# Patient Record
Sex: FEMALE | Race: WHITE | NOT HISPANIC OR LATINO
[De-identification: names, ages, dates, MRNs, and addresses within clinical notes are randomized per-mention and may not be internally consistent; named-entity substitution may affect disease eponyms.]

---

## 2017-06-14 ENCOUNTER — APPOINTMENT (OUTPATIENT)
Dept: OTOLARYNGOLOGY | Facility: CLINIC | Age: 57
End: 2017-06-14

## 2017-06-14 VITALS
WEIGHT: 159 LBS | HEART RATE: 76 BPM | BODY MASS INDEX: 24.66 KG/M2 | SYSTOLIC BLOOD PRESSURE: 129 MMHG | HEIGHT: 67.5 IN | DIASTOLIC BLOOD PRESSURE: 73 MMHG

## 2017-07-19 ENCOUNTER — APPOINTMENT (OUTPATIENT)
Dept: OTOLARYNGOLOGY | Facility: CLINIC | Age: 57
End: 2017-07-19

## 2017-07-20 ENCOUNTER — RESULT REVIEW (OUTPATIENT)
Age: 57
End: 2017-07-20

## 2017-11-13 ENCOUNTER — RX RENEWAL (OUTPATIENT)
Age: 57
End: 2017-11-13

## 2017-12-06 ENCOUNTER — APPOINTMENT (OUTPATIENT)
Dept: SPINE | Facility: CLINIC | Age: 57
End: 2017-12-06
Payer: COMMERCIAL

## 2017-12-06 VITALS
HEART RATE: 80 BPM | SYSTOLIC BLOOD PRESSURE: 120 MMHG | HEIGHT: 67.5 IN | DIASTOLIC BLOOD PRESSURE: 74 MMHG | BODY MASS INDEX: 24.51 KG/M2 | WEIGHT: 158 LBS

## 2017-12-06 PROCEDURE — 99214 OFFICE O/P EST MOD 30 MIN: CPT

## 2017-12-06 RX ORDER — ACETAMINOPHEN 325 MG/1
TABLET, FILM COATED ORAL
Refills: 0 | Status: COMPLETED | COMMUNITY
End: 2017-12-06

## 2017-12-06 RX ORDER — ASPIRIN 325 MG/1
TABLET, FILM COATED ORAL
Refills: 0 | Status: COMPLETED | COMMUNITY
End: 2017-12-06

## 2019-03-22 ENCOUNTER — OUTPATIENT (OUTPATIENT)
Dept: OUTPATIENT SERVICES | Facility: HOSPITAL | Age: 59
LOS: 1 days | End: 2019-03-22
Payer: COMMERCIAL

## 2019-03-22 ENCOUNTER — TRANSCRIPTION ENCOUNTER (OUTPATIENT)
Age: 59
End: 2019-03-22

## 2019-03-22 ENCOUNTER — APPOINTMENT (OUTPATIENT)
Dept: RADIOLOGY | Facility: HOSPITAL | Age: 59
End: 2019-03-22
Payer: COMMERCIAL

## 2019-03-22 DIAGNOSIS — R10.13 EPIGASTRIC PAIN: ICD-10-CM

## 2019-03-22 PROCEDURE — 74241: CPT | Mod: 26

## 2019-03-22 PROCEDURE — 74241: CPT

## 2019-04-08 ENCOUNTER — APPOINTMENT (OUTPATIENT)
Age: 59
End: 2019-04-08

## 2019-04-08 VITALS
SYSTOLIC BLOOD PRESSURE: 146 MMHG | RESPIRATION RATE: 15 BRPM | OXYGEN SATURATION: 98 % | BODY MASS INDEX: 24.35 KG/M2 | WEIGHT: 157 LBS | TEMPERATURE: 98.2 F | DIASTOLIC BLOOD PRESSURE: 89 MMHG | HEART RATE: 79 BPM | HEIGHT: 67.5 IN

## 2019-05-10 ENCOUNTER — RX RENEWAL (OUTPATIENT)
Age: 59
End: 2019-05-10

## 2019-05-10 ENCOUNTER — OUTPATIENT (OUTPATIENT)
Dept: OUTPATIENT SERVICES | Facility: HOSPITAL | Age: 59
LOS: 1 days | End: 2019-05-10
Payer: COMMERCIAL

## 2019-05-10 ENCOUNTER — APPOINTMENT (OUTPATIENT)
Dept: SURGERY | Facility: CLINIC | Age: 59
End: 2019-05-10
Payer: COMMERCIAL

## 2019-05-10 VITALS
SYSTOLIC BLOOD PRESSURE: 115 MMHG | HEART RATE: 71 BPM | OXYGEN SATURATION: 98 % | TEMPERATURE: 99 F | WEIGHT: 158.07 LBS | DIASTOLIC BLOOD PRESSURE: 80 MMHG | HEIGHT: 67 IN | RESPIRATION RATE: 14 BRPM

## 2019-05-10 DIAGNOSIS — Z87.898 PERSONAL HISTORY OF OTHER SPECIFIED CONDITIONS: Chronic | ICD-10-CM

## 2019-05-10 DIAGNOSIS — Z29.9 ENCOUNTER FOR PROPHYLACTIC MEASURES, UNSPECIFIED: ICD-10-CM

## 2019-05-10 DIAGNOSIS — K44.9 DIAPHRAGMATIC HERNIA WITHOUT OBSTRUCTION OR GANGRENE: ICD-10-CM

## 2019-05-10 DIAGNOSIS — Z01.818 ENCOUNTER FOR OTHER PREPROCEDURAL EXAMINATION: ICD-10-CM

## 2019-05-10 LAB
ANION GAP SERPL CALC-SCNC: 12 MMOL/L — SIGNIFICANT CHANGE UP (ref 5–17)
BLD GP AB SCN SERPL QL: NEGATIVE — SIGNIFICANT CHANGE UP
BUN SERPL-MCNC: 16 MG/DL — SIGNIFICANT CHANGE UP (ref 7–23)
CALCIUM SERPL-MCNC: 9.8 MG/DL — SIGNIFICANT CHANGE UP (ref 8.4–10.5)
CHLORIDE SERPL-SCNC: 103 MMOL/L — SIGNIFICANT CHANGE UP (ref 96–108)
CO2 SERPL-SCNC: 24 MMOL/L — SIGNIFICANT CHANGE UP (ref 22–31)
CREAT SERPL-MCNC: 0.72 MG/DL — SIGNIFICANT CHANGE UP (ref 0.5–1.3)
GLUCOSE SERPL-MCNC: 102 MG/DL — HIGH (ref 70–99)
HCT VFR BLD CALC: 44.7 % — SIGNIFICANT CHANGE UP (ref 34.5–45)
HGB BLD-MCNC: 14 G/DL — SIGNIFICANT CHANGE UP (ref 11.5–15.5)
MCHC RBC-ENTMCNC: 26.1 PG — LOW (ref 27–34)
MCHC RBC-ENTMCNC: 31.3 GM/DL — LOW (ref 32–36)
MCV RBC AUTO: 83.4 FL — SIGNIFICANT CHANGE UP (ref 80–100)
PLATELET # BLD AUTO: 274 K/UL — SIGNIFICANT CHANGE UP (ref 150–400)
POTASSIUM SERPL-MCNC: 4.6 MMOL/L — SIGNIFICANT CHANGE UP (ref 3.5–5.3)
POTASSIUM SERPL-SCNC: 4.6 MMOL/L — SIGNIFICANT CHANGE UP (ref 3.5–5.3)
RBC # BLD: 5.36 M/UL — HIGH (ref 3.8–5.2)
RBC # FLD: 14.3 % — SIGNIFICANT CHANGE UP (ref 10.3–14.5)
RH IG SCN BLD-IMP: POSITIVE — SIGNIFICANT CHANGE UP
SODIUM SERPL-SCNC: 139 MMOL/L — SIGNIFICANT CHANGE UP (ref 135–145)
WBC # BLD: 6.83 K/UL — SIGNIFICANT CHANGE UP (ref 3.8–10.5)
WBC # FLD AUTO: 6.83 K/UL — SIGNIFICANT CHANGE UP (ref 3.8–10.5)

## 2019-05-10 PROCEDURE — 99245 OFF/OP CONSLTJ NEW/EST HI 55: CPT

## 2019-05-10 PROCEDURE — 80048 BASIC METABOLIC PNL TOTAL CA: CPT

## 2019-05-10 PROCEDURE — 86900 BLOOD TYPING SEROLOGIC ABO: CPT

## 2019-05-10 PROCEDURE — G0463: CPT

## 2019-05-10 PROCEDURE — 86850 RBC ANTIBODY SCREEN: CPT

## 2019-05-10 PROCEDURE — 86901 BLOOD TYPING SEROLOGIC RH(D): CPT

## 2019-05-10 PROCEDURE — 85027 COMPLETE CBC AUTOMATED: CPT

## 2019-05-10 RX ORDER — SODIUM CHLORIDE 9 MG/ML
3 INJECTION INTRAMUSCULAR; INTRAVENOUS; SUBCUTANEOUS EVERY 8 HOURS
Refills: 0 | Status: DISCONTINUED | OUTPATIENT
Start: 2019-05-15 | End: 2019-05-15

## 2019-05-10 RX ORDER — CEFAZOLIN SODIUM 1 G
2000 VIAL (EA) INJECTION ONCE
Refills: 0 | Status: COMPLETED | OUTPATIENT
Start: 2019-05-15 | End: 2019-05-15

## 2019-05-10 RX ORDER — LIDOCAINE HCL 20 MG/ML
0.2 VIAL (ML) INJECTION ONCE
Refills: 0 | Status: DISCONTINUED | OUTPATIENT
Start: 2019-05-15 | End: 2019-05-15

## 2019-05-10 RX ORDER — CHLORHEXIDINE GLUCONATE 213 G/1000ML
1 SOLUTION TOPICAL DAILY
Refills: 0 | Status: DISCONTINUED | OUTPATIENT
Start: 2019-05-15 | End: 2019-05-15

## 2019-05-10 NOTE — H&P PST ADULT - NSICDXPASTMEDICALHX_GEN_ALL_CORE_FT
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

## 2019-05-10 NOTE — H&P PST ADULT - NSANTHOSAYNRD_GEN_A_CORE
No. DONG screening performed.  STOP BANG Legend: 0-2 = LOW Risk; 3-4 = INTERMEDIATE Risk; 5-8 = HIGH Risk

## 2019-05-10 NOTE — H&P PST ADULT - ASSESSMENT

## 2019-05-10 NOTE — H&P PST ADULT - HISTORY OF PRESENT ILLNESS
The patient is overdue to have a follow up MRI of the brain with and without contrast. A new MRI was ordered. The patient is to return with the images for review. 57 yo female. PMH benign brain tumor ("foramen magnum"), s/p excision 2013.  c/o abd discomfort, nausea.  work up revealed diapharmagmatic hernia. now presents to PST scheduled for surgical repair.

## 2019-05-10 NOTE — HISTORY OF PRESENT ILLNESS
[de-identified] : Milla is a 57 y/o female here for evaluation of paraesophageal hernia. This patient was referred with a very large hiatal hernia diagnosed by upper GI x-ray and endoscopy the patient has been known to have a hernia for some time but recently it has become symptomatic with abdominal discomfort and nausea especially after eating she denies nocturnal regurgitation she gets bloated occasionally. That has occasional shortness of breath during exercise. She is here for evaluation for repair of this large hiatal hernia and

## 2019-05-10 NOTE — PHYSICAL EXAM
[JVD] : no jugular venous distention  [Normal Breath Sounds] : Normal breath sounds [Normal Heart Sounds] : normal heart sounds [Abdominal Masses] : No abdominal masses [Abdomen Tenderness] : ~T ~M No abdominal tenderness [No HSM] : no hepatosplenomegaly [Tender] : was nontender [Enlarged] : not enlarged [No Rash or Lesion] : No rash or lesion [de-identified] : within normal limits [Calm] : calm [de-identified] : soft and nontender nondistended there are no hernias or masses appreciated [de-identified] : full range of motion [de-identified] : within normal limits [de-identified] : cranial nerves II through XII grossly intact

## 2019-05-10 NOTE — H&P PST ADULT - NSICDXPROBLEM_GEN_ALL_CORE_FT
PROBLEM DIAGNOSES  Problem: Diaphragmatic hernia  Assessment and Plan: laparoscopic paraesophageal hernia repair    Problem: Need for prophylactic measure  Assessment and Plan: The Caprini score indicates this patient is at risk for a VTE event (score 3-5).  Most surgical patients in this group would benefit from pharmacologic prophylaxis.  The surgical team will determine the balance between VTE risk and bleeding risk

## 2019-05-10 NOTE — ASSESSMENT
[FreeTextEntry1] : 58-year-old female with large intrathoracic stomach. With increasing symptoms. She is here for evaluation for repair of this large paraesophageal hernia. I have discussed at length the risks benefits and expectations of the procedure all of her questions were answered.\par The risk and benefits of the procedure were discussed with patient. The risk including, but not limited to, risk of bleeding, infection, recurrence, injury to other organs (nerves, esophagus, stomach, liver, etc), bloating, wrap being too tight and requiring reoperation or other interventions, delayed gastric emptying and persistent reflux. Several general anesthesia risks like MI, Stroke, respiratory complications and death were discussed. Need for modified diet postoperatively was discussed. All questions were answered and education materials were provided.\par \par \par

## 2019-05-10 NOTE — CONSULT LETTER
[Dear  ___] : Dear  [unfilled], [Consult Letter:] : I had the pleasure of evaluating your patient, [unfilled]. [Please see my note below.] : Please see my note below. [Consult Closing:] : Thank you very much for allowing me to participate in the care of this patient.  If you have any questions, please do not hesitate to contact me. [FreeTextEntry3] : Gregory Faye MD, FACS, FASMBS\par Chief Division of Minimally Invasive Surgery\par Co-Director of Bariatric Surgery \par [facility]\[place] [address] [Sincerely,] : Sincerely,

## 2019-05-14 ENCOUNTER — TRANSCRIPTION ENCOUNTER (OUTPATIENT)
Age: 59
End: 2019-05-14

## 2019-05-15 ENCOUNTER — APPOINTMENT (OUTPATIENT)
Dept: SURGERY | Facility: HOSPITAL | Age: 59
End: 2019-05-15
Payer: COMMERCIAL

## 2019-05-15 ENCOUNTER — OUTPATIENT (OUTPATIENT)
Dept: INPATIENT UNIT | Facility: HOSPITAL | Age: 59
LOS: 1 days | End: 2019-05-15
Payer: COMMERCIAL

## 2019-05-15 ENCOUNTER — RESULT REVIEW (OUTPATIENT)
Age: 59
End: 2019-05-15

## 2019-05-15 VITALS
TEMPERATURE: 98 F | DIASTOLIC BLOOD PRESSURE: 83 MMHG | RESPIRATION RATE: 18 BRPM | HEART RATE: 77 BPM | OXYGEN SATURATION: 97 % | HEIGHT: 67.5 IN | SYSTOLIC BLOOD PRESSURE: 135 MMHG | WEIGHT: 156.97 LBS

## 2019-05-15 DIAGNOSIS — Z87.898 PERSONAL HISTORY OF OTHER SPECIFIED CONDITIONS: Chronic | ICD-10-CM

## 2019-05-15 DIAGNOSIS — K44.9 DIAPHRAGMATIC HERNIA WITHOUT OBSTRUCTION OR GANGRENE: ICD-10-CM

## 2019-05-15 PROCEDURE — 88302 TISSUE EXAM BY PATHOLOGIST: CPT | Mod: 26

## 2019-05-15 PROCEDURE — 43281 LAP PARAESOPHAG HERN REPAIR: CPT

## 2019-05-15 RX ORDER — ACETAMINOPHEN 500 MG
1000 TABLET ORAL ONCE
Refills: 0 | Status: COMPLETED | OUTPATIENT
Start: 2019-05-15 | End: 2019-05-15

## 2019-05-15 RX ORDER — DEXTROSE MONOHYDRATE, SODIUM CHLORIDE, AND POTASSIUM CHLORIDE 50; .745; 4.5 G/1000ML; G/1000ML; G/1000ML
1000 INJECTION, SOLUTION INTRAVENOUS
Refills: 0 | Status: DISCONTINUED | OUTPATIENT
Start: 2019-05-15 | End: 2019-05-16

## 2019-05-15 RX ORDER — ACETAMINOPHEN 500 MG
1000 TABLET ORAL ONCE
Refills: 0 | Status: COMPLETED | OUTPATIENT
Start: 2019-05-16 | End: 2019-05-16

## 2019-05-15 RX ORDER — OXYCODONE HYDROCHLORIDE 5 MG/1
5 TABLET ORAL EVERY 6 HOURS
Refills: 0 | Status: DISCONTINUED | OUTPATIENT
Start: 2019-05-15 | End: 2019-05-16

## 2019-05-15 RX ORDER — ASPIRIN/CALCIUM CARB/MAGNESIUM 324 MG
1 TABLET ORAL
Qty: 0 | Refills: 0 | DISCHARGE

## 2019-05-15 RX ORDER — ENOXAPARIN SODIUM 100 MG/ML
40 INJECTION SUBCUTANEOUS EVERY 24 HOURS
Refills: 0 | Status: DISCONTINUED | OUTPATIENT
Start: 2019-05-16 | End: 2019-05-30

## 2019-05-15 RX ORDER — KETOROLAC TROMETHAMINE 30 MG/ML
15 SYRINGE (ML) INJECTION EVERY 6 HOURS
Refills: 0 | Status: DISCONTINUED | OUTPATIENT
Start: 2019-05-15 | End: 2019-05-16

## 2019-05-15 RX ORDER — HYDROMORPHONE HYDROCHLORIDE 2 MG/ML
0.25 INJECTION INTRAMUSCULAR; INTRAVENOUS; SUBCUTANEOUS
Refills: 0 | Status: DISCONTINUED | OUTPATIENT
Start: 2019-05-15 | End: 2019-05-15

## 2019-05-15 RX ORDER — ONDANSETRON 8 MG/1
4 TABLET, FILM COATED ORAL EVERY 6 HOURS
Refills: 0 | Status: DISCONTINUED | OUTPATIENT
Start: 2019-05-15 | End: 2019-05-30

## 2019-05-15 RX ORDER — DIPHENHYDRAMINE HCL 50 MG
12.5 CAPSULE ORAL ONCE
Refills: 0 | Status: COMPLETED | OUTPATIENT
Start: 2019-05-15 | End: 2019-05-15

## 2019-05-15 RX ORDER — FAMOTIDINE 10 MG/ML
20 INJECTION INTRAVENOUS ONCE
Refills: 0 | Status: COMPLETED | OUTPATIENT
Start: 2019-05-15 | End: 2019-05-15

## 2019-05-15 RX ADMIN — DEXTROSE MONOHYDRATE, SODIUM CHLORIDE, AND POTASSIUM CHLORIDE 100 MILLILITER(S): 50; .745; 4.5 INJECTION, SOLUTION INTRAVENOUS at 19:38

## 2019-05-15 RX ADMIN — FAMOTIDINE 20 MILLIGRAM(S): 10 INJECTION INTRAVENOUS at 14:46

## 2019-05-15 RX ADMIN — Medication 0.5 MILLIGRAM(S): at 15:00

## 2019-05-15 RX ADMIN — OXYCODONE HYDROCHLORIDE 5 MILLIGRAM(S): 5 TABLET ORAL at 18:00

## 2019-05-15 RX ADMIN — Medication 1000 MILLIGRAM(S): at 21:20

## 2019-05-15 RX ADMIN — ONDANSETRON 4 MILLIGRAM(S): 8 TABLET, FILM COATED ORAL at 19:01

## 2019-05-15 RX ADMIN — Medication 400 MILLIGRAM(S): at 20:49

## 2019-05-15 RX ADMIN — Medication 15 MILLIGRAM(S): at 20:17

## 2019-05-15 RX ADMIN — Medication 15 MILLIGRAM(S): at 19:36

## 2019-05-15 RX ADMIN — HYDROMORPHONE HYDROCHLORIDE 0.25 MILLIGRAM(S): 2 INJECTION INTRAMUSCULAR; INTRAVENOUS; SUBCUTANEOUS at 16:41

## 2019-05-15 RX ADMIN — OXYCODONE HYDROCHLORIDE 5 MILLIGRAM(S): 5 TABLET ORAL at 18:30

## 2019-05-15 RX ADMIN — HYDROMORPHONE HYDROCHLORIDE 0.25 MILLIGRAM(S): 2 INJECTION INTRAMUSCULAR; INTRAVENOUS; SUBCUTANEOUS at 17:00

## 2019-05-15 RX ADMIN — Medication 12.5 MILLIGRAM(S): at 14:46

## 2019-05-15 RX ADMIN — HYDROMORPHONE HYDROCHLORIDE 0.25 MILLIGRAM(S): 2 INJECTION INTRAMUSCULAR; INTRAVENOUS; SUBCUTANEOUS at 16:15

## 2019-05-15 RX ADMIN — HYDROMORPHONE HYDROCHLORIDE 0.25 MILLIGRAM(S): 2 INJECTION INTRAMUSCULAR; INTRAVENOUS; SUBCUTANEOUS at 16:00

## 2019-05-15 NOTE — BRIEF OPERATIVE NOTE - OPERATION/FINDINGS
large paraesophageal hernia  sac reduced and excised  posterior cruroplasty, nissen, posterior gastropexy

## 2019-05-15 NOTE — BRIEF OPERATIVE NOTE - NSICDXBRIEFPROCEDURE_GEN_ALL_CORE_FT
PROCEDURES:  Fundoplication, Nissen, laparoscopic 15-May-2019 14:24:07  Paulino Draper  Laparoscopic paraesophageal herniorrhaphy 15-May-2019 14:23:48  Paulino Draper

## 2019-05-15 NOTE — CHART NOTE - NSCHARTNOTEFT_GEN_A_CORE
pt was seen and examined. pain well controlled with medication. no nausea or vomiting.       Vital Signs Last 24 Hrs  T(C): 36.9 (15 May 2019 21:15), Max: 36.9 (15 May 2019 08:42)  T(F): 98.4 (15 May 2019 21:15), Max: 98.4 (15 May 2019 08:42)  HR: 80 (15 May 2019 21:15) (62 - 80)  BP: 110/64 (15 May 2019 21:15) (108/58 - 144/71)  BP(mean): 98 (15 May 2019 19:00) (76 - 104)  RR: 18 (15 May 2019 21:15) (12 - 18)  SpO2: 97% (15 May 2019 21:15) (96% - 100%)    I&O's Detail    15 May 2019 07:01  -  15 May 2019 21:23  --------------------------------------------------------  IN:    dextrose 5% + sodium chloride 0.45% with potassium chloride 20 mEq/L: 400 mL  Total IN: 400 mL    OUT:    Voided: 400 mL  Total OUT: 400 mL    Total NET: 0 mL          MEDICATIONS  (STANDING):  dextrose 5% + sodium chloride 0.45% with potassium chloride 20 mEq/L 1000 milliLiter(s) (100 mL/Hr) IV Continuous <Continuous>  ketorolac   Injectable 15 milliGRAM(s) IV Push every 6 hours  ondansetron   Disintegrating Tablet 4 milliGRAM(s) Oral every 6 hours    MEDICATIONS  (PRN):  oxyCODONE    IR 5 milliGRAM(s) Oral every 6 hours PRN Severe Pain (7 - 10)        Physical exam  no acute distress  resp: non labored  abd. soft, non distended       59 yo female s/p Fundoplication, Nissen, laparoscopy, Laparoscopic paraesophageal herniorrhaphy     NPO/IVF  pain control   FU labs

## 2019-05-16 ENCOUNTER — TRANSCRIPTION ENCOUNTER (OUTPATIENT)
Age: 59
End: 2019-05-16

## 2019-05-16 VITALS
OXYGEN SATURATION: 98 % | HEART RATE: 70 BPM | DIASTOLIC BLOOD PRESSURE: 83 MMHG | RESPIRATION RATE: 18 BRPM | TEMPERATURE: 98 F | SYSTOLIC BLOOD PRESSURE: 144 MMHG

## 2019-05-16 LAB
ANION GAP SERPL CALC-SCNC: 13 MMOL/L — SIGNIFICANT CHANGE UP (ref 5–17)
BASOPHILS # BLD AUTO: 0.01 K/UL — SIGNIFICANT CHANGE UP (ref 0–0.2)
BASOPHILS NFR BLD AUTO: 0.1 % — SIGNIFICANT CHANGE UP (ref 0–2)
BUN SERPL-MCNC: 9 MG/DL — SIGNIFICANT CHANGE UP (ref 7–23)
CALCIUM SERPL-MCNC: 9.1 MG/DL — SIGNIFICANT CHANGE UP (ref 8.4–10.5)
CHLORIDE SERPL-SCNC: 102 MMOL/L — SIGNIFICANT CHANGE UP (ref 96–108)
CO2 SERPL-SCNC: 24 MMOL/L — SIGNIFICANT CHANGE UP (ref 22–31)
CREAT SERPL-MCNC: 0.66 MG/DL — SIGNIFICANT CHANGE UP (ref 0.5–1.3)
EOSINOPHIL # BLD AUTO: 0.01 K/UL — SIGNIFICANT CHANGE UP (ref 0–0.5)
EOSINOPHIL NFR BLD AUTO: 0.1 % — SIGNIFICANT CHANGE UP (ref 0–6)
GLUCOSE SERPL-MCNC: 121 MG/DL — HIGH (ref 70–99)
HCT VFR BLD CALC: 43.2 % — SIGNIFICANT CHANGE UP (ref 34.5–45)
HGB BLD-MCNC: 13.6 G/DL — SIGNIFICANT CHANGE UP (ref 11.5–15.5)
IMM GRANULOCYTES NFR BLD AUTO: 0.3 % — SIGNIFICANT CHANGE UP (ref 0–1.5)
LYMPHOCYTES # BLD AUTO: 1.87 K/UL — SIGNIFICANT CHANGE UP (ref 1–3.3)
LYMPHOCYTES # BLD AUTO: 15.9 % — SIGNIFICANT CHANGE UP (ref 13–44)
MCHC RBC-ENTMCNC: 26.1 PG — LOW (ref 27–34)
MCHC RBC-ENTMCNC: 31.5 GM/DL — LOW (ref 32–36)
MCV RBC AUTO: 82.8 FL — SIGNIFICANT CHANGE UP (ref 80–100)
MONOCYTES # BLD AUTO: 0.73 K/UL — SIGNIFICANT CHANGE UP (ref 0–0.9)
MONOCYTES NFR BLD AUTO: 6.2 % — SIGNIFICANT CHANGE UP (ref 2–14)
NEUTROPHILS # BLD AUTO: 9.07 K/UL — HIGH (ref 1.8–7.4)
NEUTROPHILS NFR BLD AUTO: 77.4 % — HIGH (ref 43–77)
PLATELET # BLD AUTO: 278 K/UL — SIGNIFICANT CHANGE UP (ref 150–400)
POTASSIUM SERPL-MCNC: 4.5 MMOL/L — SIGNIFICANT CHANGE UP (ref 3.5–5.3)
POTASSIUM SERPL-SCNC: 4.5 MMOL/L — SIGNIFICANT CHANGE UP (ref 3.5–5.3)
RBC # BLD: 5.22 M/UL — HIGH (ref 3.8–5.2)
RBC # FLD: 14.5 % — SIGNIFICANT CHANGE UP (ref 10.3–14.5)
SODIUM SERPL-SCNC: 139 MMOL/L — SIGNIFICANT CHANGE UP (ref 135–145)
WBC # BLD: 11.73 K/UL — HIGH (ref 3.8–10.5)
WBC # FLD AUTO: 11.73 K/UL — HIGH (ref 3.8–10.5)

## 2019-05-16 PROCEDURE — C1889: CPT

## 2019-05-16 PROCEDURE — 85027 COMPLETE CBC AUTOMATED: CPT

## 2019-05-16 PROCEDURE — 43281 LAP PARAESOPHAG HERN REPAIR: CPT

## 2019-05-16 PROCEDURE — 80048 BASIC METABOLIC PNL TOTAL CA: CPT

## 2019-05-16 PROCEDURE — 88302 TISSUE EXAM BY PATHOLOGIST: CPT

## 2019-05-16 RX ORDER — ACETAMINOPHEN 500 MG
1000 TABLET ORAL EVERY 6 HOURS
Refills: 0 | Status: DISCONTINUED | OUTPATIENT
Start: 2019-05-16 | End: 2019-05-30

## 2019-05-16 RX ORDER — OXYCODONE HYDROCHLORIDE 5 MG/1
5 TABLET ORAL
Qty: 50 | Refills: 0
Start: 2019-05-16 | End: 2019-05-25

## 2019-05-16 RX ADMIN — ONDANSETRON 4 MILLIGRAM(S): 8 TABLET, FILM COATED ORAL at 11:47

## 2019-05-16 RX ADMIN — Medication 1000 MILLIGRAM(S): at 07:30

## 2019-05-16 RX ADMIN — Medication 400 MILLIGRAM(S): at 07:00

## 2019-05-16 RX ADMIN — Medication 15 MILLIGRAM(S): at 01:57

## 2019-05-16 RX ADMIN — Medication 1000 MILLIGRAM(S): at 02:50

## 2019-05-16 RX ADMIN — Medication 400 MILLIGRAM(S): at 02:19

## 2019-05-16 RX ADMIN — ONDANSETRON 4 MILLIGRAM(S): 8 TABLET, FILM COATED ORAL at 00:41

## 2019-05-16 RX ADMIN — ENOXAPARIN SODIUM 40 MILLIGRAM(S): 100 INJECTION SUBCUTANEOUS at 05:03

## 2019-05-16 RX ADMIN — Medication 15 MILLIGRAM(S): at 07:39

## 2019-05-16 RX ADMIN — ONDANSETRON 4 MILLIGRAM(S): 8 TABLET, FILM COATED ORAL at 05:06

## 2019-05-16 RX ADMIN — OXYCODONE HYDROCHLORIDE 5 MILLIGRAM(S): 5 TABLET ORAL at 11:47

## 2019-05-16 RX ADMIN — Medication 15 MILLIGRAM(S): at 01:18

## 2019-05-16 RX ADMIN — Medication 1000 MILLIGRAM(S): at 13:40

## 2019-05-16 RX ADMIN — Medication 15 MILLIGRAM(S): at 08:09

## 2019-05-16 NOTE — DISCHARGE NOTE PROVIDER - NSDCCPCAREPLAN_GEN_ALL_CORE_FT
PRINCIPAL DISCHARGE DIAGNOSIS  Diagnosis: Paraesophageal hernia  Assessment and Plan of Treatment: Follow-up with your surgeon in 1-2 weeks.  Continue with fulls diet. Avoid carbonated beverages.   Take over the counter pain medication as needed.

## 2019-05-16 NOTE — PROGRESS NOTE ADULT - SUBJECTIVE AND OBJECTIVE BOX
Surgery Progress Note    Subjective:  Yesterday pt had a large paraesophageal hernia repair. No acute events overnight. Pt seen at bedside.    Objective:  Gen: NAD  Resp: No increased work of breathing  Abd: s, nt, nd        T(C): 36.7 (05-16-19 @ 01:40), Max: 36.9 (05-15-19 @ 08:42)  HR: 74 (05-16-19 @ 01:40) (62 - 81)  BP: 103/64 (05-16-19 @ 01:40) (103/64 - 144/71)  RR: 18 (05-16-19 @ 01:40) (12 - 18)  SpO2: 97% (05-16-19 @ 01:40) (96% - 100%)    05-15-19 @ 07:01  -  05-16-19 @ 05:25  --------------------------------------------------------  IN: 400 mL / OUT: 1100 mL / NET: -700 mL        LABS

## 2019-05-16 NOTE — DISCHARGE NOTE PROVIDER - HOSPITAL COURSE
58F presented on May 15, 2019 for paraesophageal hernia repair. Pt tolerated procedure well. Pt was ambulating and tolerating full liquid diet the following day. Pt stable on discharge.

## 2019-05-16 NOTE — DISCHARGE NOTE PROVIDER - NSDCCPTREATMENT_GEN_ALL_CORE_FT
PRINCIPAL PROCEDURE  Procedure: Laparoscopic repair of paraesophageal hernia  Findings and Treatment: large paraesophageal hernia  sac reduced and excised  posterior cruroplasty, nissen, posterior gastropexy

## 2019-05-16 NOTE — PROVIDER CONTACT NOTE (OTHER) - ACTION/TREATMENT ORDERED:
Team at bedside at present time no further actions at present time pt resting comfortably in bed will cont to monitor

## 2019-05-16 NOTE — PROGRESS NOTE ADULT - ASSESSMENT
58M s/p large paraesophageal hernia repair 5/15      -clears diet  -lovenox for DVT ppx    [facility]  [id number]

## 2019-05-16 NOTE — DISCHARGE NOTE NURSING/CASE MANAGEMENT/SOCIAL WORK - NSDCDPATPORTLINK_GEN_ALL_CORE
You can access the [facility] Portal, offered by [facility], by registering with the following website: [email or web address]

## 2019-05-16 NOTE — DISCHARGE NOTE NURSING/CASE MANAGEMENT/SOCIAL WORK - NSDCPNINST_GEN_ALL_CORE
call MD // go to ER:  temperature, nausea, vomiting, abdominal swelling; check site daily for redness, warmth, bleeding, drainage with foul odor

## 2019-05-16 NOTE — DISCHARGE NOTE PROVIDER - CARE PROVIDER_API CALL
Monster Oquendo)  Emergency Medicine  [address] [place] [address]  Phone: [phone]  Fax: [phone]  Follow Up Time:

## 2019-05-16 NOTE — PROVIDER CONTACT NOTE (OTHER) - ASSESSMENT
Redness noticed across cheeks and on bridge of nose. Eyes redden and watery No hives noted no difficulty swallowing reported.

## 2019-05-24 ENCOUNTER — APPOINTMENT (OUTPATIENT)
Dept: SURGERY | Facility: CLINIC | Age: 59
End: 2019-05-24
Payer: COMMERCIAL

## 2019-05-24 VITALS
OXYGEN SATURATION: 98 % | TEMPERATURE: 98.5 F | RESPIRATION RATE: 15 BRPM | SYSTOLIC BLOOD PRESSURE: 128 MMHG | HEART RATE: 77 BPM | DIASTOLIC BLOOD PRESSURE: 77 MMHG

## 2019-05-24 PROCEDURE — 99024 POSTOP FOLLOW-UP VISIT: CPT

## 2019-05-24 NOTE — HISTORY OF PRESENT ILLNESS
[de-identified] : Milla is a 59 y/o female here for a post op visit following a Laparoscopic repair of paraesophageal hernia and Nissen fundoplication.The patient is doing very well tolerating her diet no nausea no vomiting no reflux no bloating no diarrhea no reflux.

## 2019-05-24 NOTE — ASSESSMENT
[FreeTextEntry1] : 58-year-old female status post repair paraesophageal hernia doing extremely well tolerating her diet advanced her diet removed her Steri-Strips and she will return for followup in 2 months

## 2019-06-11 ENCOUNTER — CHART COPY (OUTPATIENT)
Age: 59
End: 2019-06-11

## 2019-06-11 PROBLEM — Z87.898 PERSONAL HISTORY OF OTHER SPECIFIED CONDITIONS: Chronic | Status: ACTIVE | Noted: 2019-05-10

## 2019-06-14 ENCOUNTER — APPOINTMENT (OUTPATIENT)
Dept: SURGERY | Facility: CLINIC | Age: 59
End: 2019-06-14
Payer: COMMERCIAL

## 2019-06-14 PROCEDURE — 99024 POSTOP FOLLOW-UP VISIT: CPT | Mod: 24

## 2019-06-14 PROCEDURE — 99213 OFFICE O/P EST LOW 20 MIN: CPT | Mod: 24

## 2019-06-14 NOTE — PLAN
[FreeTextEntry1] : Upper GI series to evaluate hiatal repair and wrap\par RUQ sono to eval for biliary colic\par Continue modified diet\par Return to office in 1 month for re-eval\par \par Discussed with Dr. Faye\par \par This patient was seen and examined by me all of her questions were answered. The patient has been having some nausea and right upper quadrant abdominal discomfort she is burping she does not have gas bloat there is no evidence of diarrhea and she is able to tolerate a regular diet with no dysphagia. We will check gI for position of the wrap as well as emptying of the stomach I have also recommended an ultrasound to evaluate for possible biliary tract disease to rule out gallstones on her questions were answered.\par \par Gregory Faye MD, FACS, FASMBS\par Chief Division of Minimally Invasive Surgery\par Co-Director of Bariatric Surgery \par [facility]\[place] [address]\par \par

## 2019-06-14 NOTE — PHYSICAL EXAM
[JVD] : no jugular venous distention  [Normal Breath Sounds] : Normal breath sounds [Normal Heart Sounds] : normal heart sounds [Tender] : was nontender [Calm] : calm [de-identified] : nad pleasant [de-identified] : within normal limits [de-identified] : soft, nt, nd, inc all well-healed

## 2019-06-14 NOTE — ASSESSMENT
[FreeTextEntry1] : 58F s/p lap paraesophageal hernia repair with Nissen 5/2019 (1mo post-op)\par \par Now with upper abdominal discomfort and nausea which she relays may be getting worse

## 2019-06-14 NOTE — HISTORY OF PRESENT ILLNESS
[de-identified] : 58F hx lap paraesophageal hernia repair and Nissen fundoplication in May 2019 with Dr. Faye [de-identified] : Here for 1 month post-op visit\par Having significant upper abdominal discomfort\par Says burping a lot\par No vomiting but does feel nauseous often\par Not having trouble hydrating herself or keeping liquids down\par Solids she says are tolerated better than liquids\par She is avoiding carbonated beverages and straws

## 2019-06-28 ENCOUNTER — OUTPATIENT (OUTPATIENT)
Dept: OUTPATIENT SERVICES | Facility: HOSPITAL | Age: 59
LOS: 1 days | End: 2019-06-28
Payer: COMMERCIAL

## 2019-06-28 ENCOUNTER — APPOINTMENT (OUTPATIENT)
Dept: ULTRASOUND IMAGING | Facility: HOSPITAL | Age: 59
End: 2019-06-28
Payer: COMMERCIAL

## 2019-06-28 ENCOUNTER — APPOINTMENT (OUTPATIENT)
Dept: RADIOLOGY | Facility: HOSPITAL | Age: 59
End: 2019-06-28
Payer: COMMERCIAL

## 2019-06-28 DIAGNOSIS — Z00.00 ENCOUNTER FOR GENERAL ADULT MEDICAL EXAMINATION WITHOUT ABNORMAL FINDINGS: ICD-10-CM

## 2019-06-28 DIAGNOSIS — R10.10 UPPER ABDOMINAL PAIN, UNSPECIFIED: ICD-10-CM

## 2019-06-28 DIAGNOSIS — Z87.898 PERSONAL HISTORY OF OTHER SPECIFIED CONDITIONS: Chronic | ICD-10-CM

## 2019-06-28 PROCEDURE — 76705 ECHO EXAM OF ABDOMEN: CPT | Mod: 26,RT

## 2019-06-28 PROCEDURE — 74241: CPT | Mod: 26

## 2019-06-28 PROCEDURE — 74241: CPT

## 2019-06-28 PROCEDURE — 76705 ECHO EXAM OF ABDOMEN: CPT

## 2019-07-08 ENCOUNTER — APPOINTMENT (OUTPATIENT)
Dept: MRI IMAGING | Facility: CLINIC | Age: 59
End: 2019-07-08

## 2019-07-19 ENCOUNTER — APPOINTMENT (OUTPATIENT)
Dept: SURGERY | Facility: CLINIC | Age: 59
End: 2019-07-19
Payer: COMMERCIAL

## 2019-07-19 DIAGNOSIS — Z80.8 FAMILY HISTORY OF MALIGNANT NEOPLASM OF OTHER ORGANS OR SYSTEMS: ICD-10-CM

## 2019-07-19 PROCEDURE — 99214 OFFICE O/P EST MOD 30 MIN: CPT | Mod: 24

## 2019-07-19 PROCEDURE — 99024 POSTOP FOLLOW-UP VISIT: CPT

## 2019-07-19 RX ORDER — OMEPRAZOLE 40 MG/1
40 CAPSULE, DELAYED RELEASE ORAL TWICE DAILY
Qty: 30 | Refills: 0 | Status: DISCONTINUED | COMMUNITY
Start: 2019-06-11 | End: 2019-07-19

## 2019-07-19 NOTE — REASON FOR VISIT
[Post Op: _________] : a [unfilled] post op visit [FreeTextEntry1] : Laparoscopic repair of paraesophageal hernia and Nissen fundoplication.

## 2019-07-19 NOTE — HISTORY OF PRESENT ILLNESS
[de-identified] : Milla is a 57 y/o female 2 months s/p Laparoscopic repair of paraesophageal hernia and Nissen fundoplication.\par Patient was last seen 6/14/19 with significant upper abdominal discomfort and nausea. RUQ sono ordered to evaluate for biliary colic. \par Gallbladder contracted. 6 mm polyp projects from the anterior wall of the gallbladder and a 2 mm echodensity is also noted, represented a small stone or additional polyp. \par she has no ab and occasionally complains of nausea. CAT scan and upper GI FUNDOPLICAtion WITH CONTRAST GOING EASILY THROUGH THE WRAP A BARIUM PILL INITIALLY HAD DELAYED ABOVE THE WRAP BUT THE PATIENT C IS ABLE To Tolerated regular diet without dysphagia her main complaint is nausea no vomiting no diarrhea no fevers or chills

## 2019-07-19 NOTE — PHYSICAL EXAM
[JVD] : no jugular venous distention  [Normal Breath Sounds] : Normal breath sounds [Normal Heart Sounds] : normal heart sounds [Calm] : calm [de-identified] : within normal limits ambulating without difficulty [de-identified] : normal [de-identified] : soft nontender nondistended no hernias or mas present [de-identified] : full range of [de-identified] : cranial nerves II through XII grossly intact

## 2019-07-19 NOTE — ASSESSMENT
[FreeTextEntry1] : 58-year-old female status post paraesophageal hernia repair is complaining of nausea and occasional burping she has no vomiting she is tolerating checked insertion so and is moving her bowels and CAT scan and upper GI revealed a wrap to be in place she has a small polyp or possible stone in the gallbladder but has no abdominal pain. I do not believe at present time he studies indicate biliary tract disease given the fact that her nausea is only intermittent and there is no other associated findings the patient would like to give it some time I believe this is appropriate and the patient will followup in the next few months. All of her questions were answered

## 2019-08-21 ENCOUNTER — APPOINTMENT (OUTPATIENT)
Dept: SPINE | Facility: CLINIC | Age: 59
End: 2019-08-21
Payer: COMMERCIAL

## 2019-08-21 VITALS
WEIGHT: 152 LBS | SYSTOLIC BLOOD PRESSURE: 124 MMHG | BODY MASS INDEX: 23.86 KG/M2 | DIASTOLIC BLOOD PRESSURE: 76 MMHG | HEIGHT: 67 IN | HEART RATE: 83 BPM

## 2019-08-21 PROCEDURE — 99214 OFFICE O/P EST MOD 30 MIN: CPT

## 2019-09-06 ENCOUNTER — RESULT REVIEW (OUTPATIENT)
Age: 59
End: 2019-09-06

## 2019-09-06 DIAGNOSIS — Z98.890 OTHER SPECIFIED POSTPROCEDURAL STATES: ICD-10-CM

## 2019-11-07 PROBLEM — Z98.890 STATUS POST NISSEN FUNDOPLICATION: Status: ACTIVE | Noted: 2019-11-07

## 2019-11-25 ENCOUNTER — FORM ENCOUNTER (OUTPATIENT)
Age: 59
End: 2019-11-25

## 2019-11-26 ENCOUNTER — APPOINTMENT (OUTPATIENT)
Dept: RADIOLOGY | Facility: HOSPITAL | Age: 59
End: 2019-11-26
Payer: COMMERCIAL

## 2019-11-26 ENCOUNTER — OUTPATIENT (OUTPATIENT)
Dept: OUTPATIENT SERVICES | Facility: HOSPITAL | Age: 59
LOS: 1 days | End: 2019-11-26
Payer: COMMERCIAL

## 2019-11-26 DIAGNOSIS — R10.10 UPPER ABDOMINAL PAIN, UNSPECIFIED: ICD-10-CM

## 2019-11-26 DIAGNOSIS — Z00.00 ENCOUNTER FOR GENERAL ADULT MEDICAL EXAMINATION WITHOUT ABNORMAL FINDINGS: ICD-10-CM

## 2019-11-26 DIAGNOSIS — Z98.890 OTHER SPECIFIED POSTPROCEDURAL STATES: ICD-10-CM

## 2019-11-26 DIAGNOSIS — Z87.898 PERSONAL HISTORY OF OTHER SPECIFIED CONDITIONS: Chronic | ICD-10-CM

## 2019-11-26 DIAGNOSIS — R11.0 NAUSEA: ICD-10-CM

## 2019-11-26 PROCEDURE — 74241: CPT

## 2019-11-26 PROCEDURE — 74241: CPT | Mod: 26

## 2020-12-09 ENCOUNTER — LABORATORY RESULT (OUTPATIENT)
Age: 60
End: 2020-12-09

## 2020-12-09 ENCOUNTER — NON-APPOINTMENT (OUTPATIENT)
Age: 60
End: 2020-12-09

## 2020-12-09 ENCOUNTER — APPOINTMENT (OUTPATIENT)
Dept: INTERNAL MEDICINE | Facility: CLINIC | Age: 60
End: 2020-12-09
Payer: COMMERCIAL

## 2020-12-09 VITALS
HEIGHT: 67 IN | BODY MASS INDEX: 23.23 KG/M2 | SYSTOLIC BLOOD PRESSURE: 110 MMHG | DIASTOLIC BLOOD PRESSURE: 70 MMHG | WEIGHT: 148 LBS | HEART RATE: 73 BPM

## 2020-12-09 DIAGNOSIS — I83.93 ASYMPTOMATIC VARICOSE VEINS OF BILATERAL LOWER EXTREMITIES: ICD-10-CM

## 2020-12-09 DIAGNOSIS — R51.9 HEADACHE, UNSPECIFIED: ICD-10-CM

## 2020-12-09 DIAGNOSIS — Z09 ENCOUNTER FOR FOLLOW-UP EXAMINATION AFTER COMPLETED TREATMENT FOR CONDITIONS OTHER THAN MALIGNANT NEOPLASM: ICD-10-CM

## 2020-12-09 DIAGNOSIS — R10.10 UPPER ABDOMINAL PAIN, UNSPECIFIED: ICD-10-CM

## 2020-12-09 DIAGNOSIS — Z87.898 PERSONAL HISTORY OF OTHER SPECIFIED CONDITIONS: ICD-10-CM

## 2020-12-09 DIAGNOSIS — Z23 ENCOUNTER FOR IMMUNIZATION: ICD-10-CM

## 2020-12-09 DIAGNOSIS — K44.9 DIAPHRAGMATIC HERNIA W/OUT OBSTRUCTION OR GANGRENE: ICD-10-CM

## 2020-12-09 DIAGNOSIS — L72.3 SEBACEOUS CYST: ICD-10-CM

## 2020-12-09 DIAGNOSIS — Z82.0 FAMILY HISTORY OF EPILEPSY AND OTHER DISEASES OF THE NERVOUS SYSTEM: ICD-10-CM

## 2020-12-09 DIAGNOSIS — R26.89 OTHER ABNORMALITIES OF GAIT AND MOBILITY: ICD-10-CM

## 2020-12-09 DIAGNOSIS — Z11.59 ENCOUNTER FOR SCREENING FOR OTHER VIRAL DISEASES: ICD-10-CM

## 2020-12-09 DIAGNOSIS — Z83.79 FAMILY HISTORY OF OTHER DISEASES OF THE DIGESTIVE SYSTEM: ICD-10-CM

## 2020-12-09 DIAGNOSIS — Z84.89 FAMILY HISTORY OF OTHER SPECIFIED CONDITIONS: ICD-10-CM

## 2020-12-09 DIAGNOSIS — Z86.69 PERSONAL HISTORY OF OTHER DISEASES OF THE NERVOUS SYSTEM AND SENSE ORGANS: ICD-10-CM

## 2020-12-09 PROCEDURE — 99072 ADDL SUPL MATRL&STAF TM PHE: CPT

## 2020-12-09 PROCEDURE — 36415 COLL VENOUS BLD VENIPUNCTURE: CPT

## 2020-12-09 PROCEDURE — 90715 TDAP VACCINE 7 YRS/> IM: CPT

## 2020-12-09 PROCEDURE — 90471 IMMUNIZATION ADMIN: CPT

## 2020-12-09 PROCEDURE — 99386 PREV VISIT NEW AGE 40-64: CPT | Mod: 25

## 2020-12-09 PROCEDURE — 93000 ELECTROCARDIOGRAM COMPLETE: CPT

## 2020-12-09 RX ORDER — BACILLUS COAGULANS/INULIN 1B-250 MG
CAPSULE ORAL
Refills: 0 | Status: DISCONTINUED | COMMUNITY
End: 2020-12-09

## 2020-12-09 RX ORDER — MELATONIN 3 MG
3 CAPSULE ORAL AT BEDTIME
Refills: 0 | Status: ACTIVE | COMMUNITY
Start: 2020-12-09

## 2020-12-09 RX ORDER — ASPIRIN 81 MG
81 TABLET,CHEWABLE ORAL
Refills: 0 | Status: DISCONTINUED | COMMUNITY
End: 2020-12-09

## 2020-12-11 ENCOUNTER — NON-APPOINTMENT (OUTPATIENT)
Age: 60
End: 2020-12-11

## 2020-12-11 LAB
ALBUMIN SERPL ELPH-MCNC: 4.6 G/DL
ALP BLD-CCNC: 69 U/L
ALT SERPL-CCNC: 11 U/L
ANION GAP SERPL CALC-SCNC: 11 MMOL/L
AST SERPL-CCNC: 12 U/L
BASOPHILS # BLD AUTO: 0.04 K/UL
BASOPHILS NFR BLD AUTO: 0.7 %
BILIRUB SERPL-MCNC: 0.4 MG/DL
BUN SERPL-MCNC: 18 MG/DL
CALCIUM SERPL-MCNC: 9.8 MG/DL
CHLORIDE SERPL-SCNC: 102 MMOL/L
CO2 SERPL-SCNC: 26 MMOL/L
CREAT SERPL-MCNC: 0.81 MG/DL
EOSINOPHIL # BLD AUTO: 0.06 K/UL
EOSINOPHIL NFR BLD AUTO: 1 %
GLUCOSE SERPL-MCNC: 100 MG/DL
HCT VFR BLD CALC: 47.3 %
HCV AB SER QL: NONREACTIVE
HCV S/CO RATIO: 0.12 S/CO
HGB BLD-MCNC: 14.4 G/DL
IMM GRANULOCYTES NFR BLD AUTO: 0.3 %
LYMPHOCYTES # BLD AUTO: 1.52 K/UL
LYMPHOCYTES NFR BLD AUTO: 24.8 %
MAN DIFF?: NORMAL
MCHC RBC-ENTMCNC: 25.9 PG
MCHC RBC-ENTMCNC: 30.4 GM/DL
MCV RBC AUTO: 85.1 FL
MONOCYTES # BLD AUTO: 0.33 K/UL
MONOCYTES NFR BLD AUTO: 5.4 %
NEUTROPHILS # BLD AUTO: 4.16 K/UL
NEUTROPHILS NFR BLD AUTO: 67.8 %
PLATELET # BLD AUTO: 245 K/UL
POTASSIUM SERPL-SCNC: 5.1 MMOL/L
PROT SERPL-MCNC: 7.3 G/DL
RBC # BLD: 5.56 M/UL
RBC # FLD: 13.9 %
SARS-COV-2 IGG SERPL IA-ACNC: 0.09 INDEX
SARS-COV-2 IGG SERPL QL IA: NEGATIVE
SODIUM SERPL-SCNC: 140 MMOL/L
WBC # FLD AUTO: 6.13 K/UL

## 2020-12-14 ENCOUNTER — NON-APPOINTMENT (OUTPATIENT)
Age: 60
End: 2020-12-14

## 2020-12-15 ENCOUNTER — NON-APPOINTMENT (OUTPATIENT)
Age: 60
End: 2020-12-15

## 2020-12-15 DIAGNOSIS — G47.00 INSOMNIA, UNSPECIFIED: ICD-10-CM

## 2020-12-15 DIAGNOSIS — R53.83 OTHER FATIGUE: ICD-10-CM

## 2020-12-15 LAB
25(OH)D3 SERPL-MCNC: 18.3 NG/ML
APPEARANCE: CLEAR
BILIRUBIN URINE: NEGATIVE
BLOOD URINE: NEGATIVE
CHOLEST SERPL-MCNC: 215 MG/DL
COLOR: NORMAL
ESTIMATED AVERAGE GLUCOSE: 117 MG/DL
FOLATE SERPL-MCNC: 9.8 NG/ML
GLUCOSE QUALITATIVE U: NEGATIVE
HBA1C MFR BLD HPLC: 5.7 %
HDLC SERPL-MCNC: 71 MG/DL
KETONES URINE: NEGATIVE
LDLC SERPL CALC-MCNC: 130 MG/DL
LEUKOCYTE ESTERASE URINE: ABNORMAL
NITRITE URINE: NEGATIVE
NONHDLC SERPL-MCNC: 144 MG/DL
PH URINE: 6.5
PROTEIN URINE: NEGATIVE
SPECIFIC GRAVITY URINE: 1.02
T3 SERPL-MCNC: 102 NG/DL
T4 FREE SERPL-MCNC: 1.2 NG/DL
TRIGL SERPL-MCNC: 69 MG/DL
TSH SERPL-ACNC: 2.05 UIU/ML
UROBILINOGEN URINE: NORMAL
VIT B12 SERPL-MCNC: 608 PG/ML

## 2020-12-16 ENCOUNTER — TRANSCRIPTION ENCOUNTER (OUTPATIENT)
Age: 60
End: 2020-12-16

## 2020-12-24 ENCOUNTER — TRANSCRIPTION ENCOUNTER (OUTPATIENT)
Age: 60
End: 2020-12-24

## 2020-12-29 ENCOUNTER — APPOINTMENT (OUTPATIENT)
Dept: ORTHOPEDIC SURGERY | Facility: CLINIC | Age: 60
End: 2020-12-29
Payer: COMMERCIAL

## 2020-12-29 DIAGNOSIS — M75.51 BURSITIS OF RIGHT SHOULDER: ICD-10-CM

## 2020-12-29 PROCEDURE — 73130 X-RAY EXAM OF HAND: CPT | Mod: RT

## 2020-12-29 PROCEDURE — 99203 OFFICE O/P NEW LOW 30 MIN: CPT

## 2020-12-29 PROCEDURE — 99072 ADDL SUPL MATRL&STAF TM PHE: CPT

## 2021-01-08 ENCOUNTER — APPOINTMENT (OUTPATIENT)
Dept: NEUROLOGY | Facility: CLINIC | Age: 61
End: 2021-01-08
Payer: COMMERCIAL

## 2021-01-08 VITALS
HEART RATE: 80 BPM | SYSTOLIC BLOOD PRESSURE: 137 MMHG | HEIGHT: 67 IN | WEIGHT: 148 LBS | BODY MASS INDEX: 23.23 KG/M2 | DIASTOLIC BLOOD PRESSURE: 82 MMHG

## 2021-01-08 VITALS — TEMPERATURE: 97.5 F

## 2021-01-08 DIAGNOSIS — Z86.018 PERSONAL HISTORY OF OTHER BENIGN NEOPLASM: ICD-10-CM

## 2021-01-08 PROCEDURE — 99072 ADDL SUPL MATRL&STAF TM PHE: CPT

## 2021-01-08 PROCEDURE — 99204 OFFICE O/P NEW MOD 45 MIN: CPT

## 2021-01-09 NOTE — DATA REVIEWED
[de-identified] : I also reviewed extensive medical records of the neurosurgeon, orthopedic surgeon and my prior neurological consultation before 2015 and noted the contents but she has been neurologically normal and did not reveal any lower cranial nerve or upper cervical radicular disease. [de-identified] : I reviewed MRI films and reports of the foramen magnum area revealing meningioma which was successfully resected.

## 2021-01-09 NOTE — HISTORY OF PRESENT ILLNESS
[FreeTextEntry1] : Ms. Soni is a 60-year-old  female who was last evaluated in year 2015 whereby she had history of neck pain and resection of foramen magnum meningioma and following the surgery she did not show any significant improvement but the postoperative MRI did reveal successful surgery and was followed by her neurosurgeon.  She continues to have pain in the cervical area and in the lower occipital area since 2013 following surgery including upper cervical pain which was graded as 8/10 constant including the occipital pain of 8/10 which is also constant and tends to fluctuate and that she cannot find a comfortable position has difficulty in sleep and is usually movement induced but continues to exercise at night by swimming for half an hour 4 times a week.  She however denied any tingling numbness or any form of sensory symptoms in the posterior occipital or parietal area and no radicular symptoms in the neck in any of the cervical segments including both upper extremities and there is no focal motor weakness.  She denied any ataxia stiffness in the legs knee buckling bowel or bladder symptoms and there are no chest or abdominal symptoms.\par \par Past medical history is pertinent for history of anxiety and also stated that she has a small left acoustic neuroma ( schwannoma) noted in the MRI and this has remained essentially unchanged and currently there is no progressive hearing loss vertigo ear pain facial numbness or facial weakness.  She was also evaluated by an orthopedic surgeon for wrist pain and was treated symptomatically without any carpal tunnel syndrome or neuritis.  She also has history of right shoulder pain with some decreased range of motion and was appropriately treated approximately 9 months back and is much improved.\par \par Review of systems is unremarkable and personal history is noncontributory and I reviewed her medications and allergies.

## 2021-01-09 NOTE — REVIEW OF SYSTEMS
[Tension Headache] : tension-type headaches [Anxiety] : anxiety [As Noted in HPI] : as noted in HPI [Arthralgias] : arthralgias [Negative] : Heme/Lymph [Confused or Disoriented] : no confusion [Memory Lapses or Loss] : no memory loss [Decr. Concentrating Ability] : no decrease in concentrating ability [Difficulty with Language] : no ~M difficulty with language [Changed Thought Patterns] : no change in thought patterns [Repeating Questions] : no repeated questioning about recent events [Facial Weakness] : no facial weakness [Arm Weakness] : no arm weakness [Hand Weakness] : no hand weakness [Poor Coordination] : good coordination [Leg Weakness] : no leg weakness [Difficulty Writing] : no difficulty writing [Difficulties in Speech] : no speech difficulties [Numbness] : no numbness [Tingling] : no tingling [Hypersensitivity] : no hypersensitivity [Abnormal Sensation] : no abnormal sensation [Seizures] : no convulsions [Dizziness] : no dizziness [Fainting] : no fainting [Lightheadedness] : no lightheadedness [Vertigo] : no vertigo [Cluster Headache] : no cluster headache [Migraine Headache] : no migraine headache [Difficulty Walking] : no difficulty walking [Inability to Walk] : able to walk [Ataxia] : no ataxia [Frequent Falls] : not falling [Limping] : not limping [de-identified] : Chronic constant neck and suboccipital pain without neurological symptoms.

## 2021-01-09 NOTE — DISCUSSION/SUMMARY
[FreeTextEntry1] : Opinion–the patient has a history of resection of normal magnum meningioma and currently has severe pain in the neck and suboccipital area without any neurological symptoms or signs and was advised to obtain MRI covering the region of foramen magnum with and without contrast and prescription was provided pending authorization.  She was advised that meningiomas are usually benign tumors and will slowly grow and it is necessary to obtain MRI because of severe neck and suboccipital pain and she understands.  After the MRI I will appropriately advised her regarding further treatment for pain including referral back to Dr. Acosta to evaluate her with review of the MRI.  Extensive education and counseling was undertaken.  She understands and will proceed with my advice.

## 2021-01-09 NOTE — PHYSICAL EXAM
[General Appearance - Alert] : alert [Oriented To Time, Place, And Person] : oriented to person, place, and time [General Appearance - In No Acute Distress] : in no acute distress [Impaired Insight] : insight and judgment were intact [Affect] : the affect was normal [Person] : oriented to person [Place] : oriented to place [Time] : oriented to time [Concentration Intact] : normal concentrating ability [Visual Intact] : visual attention was ~T not ~L decreased [Naming Objects] : no difficulty naming common objects [Repeating Phrases] : no difficulty repeating a phrase [Writing A Sentence] : no difficulty writing a sentence [Fluency] : fluency intact [Reading] : reading intact [Comprehension] : comprehension intact [Past History] : adequate knowledge of personal past history [Cranial Nerves Optic (II)] : visual acuity intact bilaterally,  visual fields full to confrontation, pupils equal round and reactive to light [Cranial Nerves Oculomotor (III)] : extraocular motion intact [Cranial Nerves Trigeminal (V)] : facial sensation intact symmetrically [Cranial Nerves Facial (VII)] : face symmetrical [Cranial Nerves Vestibulocochlear (VIII)] : hearing was intact bilaterally [Cranial Nerves Glossopharyngeal (IX)] : tongue and palate midline [Cranial Nerves Accessory (XI - Cranial And Spinal)] : head turning and shoulder shrug symmetric [Cranial Nerves Hypoglossal (XII)] : there was no tongue deviation with protrusion [Motor Tone] : muscle tone was normal in all four extremities [Motor Strength] : muscle strength was normal in all four extremities [No Muscle Atrophy] : normal bulk in all four extremities [Sensation Tactile Decrease] : light touch was intact [Abnormal Walk] : normal gait [Balance] : balance was intact [2+] : Ankle jerk left 2+ [FreeTextEntry1] : General examination is unremarkable.  There is no carotid bruit, thyromegaly or lymphadenopathy.  Chest is clear and heart sounds are normal.  Abdomen is soft.  Neck is supple and in fact the range of motion is unremarkable.  There is no muscle spasm or spine tenderness.  Surgical scar is healthy.  There is no sinus tenderness.  There is no carotid bruit, thyromegaly or lymphadenopathy.  There are no meningeal signs.  Pedal pulsations are normal and there is no leg edema.\par \par Neurological examination is completely normal as delineated.  Cervical extension caused neck discomfort and there is minimal pain during the right shoulder range of motion testing. [Past-pointing] : there was no past-pointing [Tremor] : no tremor present [Plantar Reflex Right Only] : normal on the right [Plantar Reflex Left Only] : normal on the left

## 2021-01-19 ENCOUNTER — TRANSCRIPTION ENCOUNTER (OUTPATIENT)
Age: 61
End: 2021-01-19

## 2021-01-27 ENCOUNTER — APPOINTMENT (OUTPATIENT)
Dept: MRI IMAGING | Facility: CLINIC | Age: 61
End: 2021-01-27

## 2021-01-27 ENCOUNTER — OUTPATIENT (OUTPATIENT)
Dept: OUTPATIENT SERVICES | Facility: HOSPITAL | Age: 61
LOS: 1 days | End: 2021-01-27
Payer: COMMERCIAL

## 2021-01-27 DIAGNOSIS — D33.3 BENIGN NEOPLASM OF CRANIAL NERVES: ICD-10-CM

## 2021-01-27 DIAGNOSIS — Z87.898 PERSONAL HISTORY OF OTHER SPECIFIED CONDITIONS: Chronic | ICD-10-CM

## 2021-01-27 PROCEDURE — 70553 MRI BRAIN STEM W/O & W/DYE: CPT | Mod: 26

## 2021-01-27 PROCEDURE — 70553 MRI BRAIN STEM W/O & W/DYE: CPT

## 2021-01-27 PROCEDURE — A9585: CPT

## 2021-02-23 ENCOUNTER — TRANSCRIPTION ENCOUNTER (OUTPATIENT)
Age: 61
End: 2021-02-23

## 2021-03-22 ENCOUNTER — APPOINTMENT (OUTPATIENT)
Dept: PAIN MANAGEMENT | Facility: CLINIC | Age: 61
End: 2021-03-22
Payer: COMMERCIAL

## 2021-03-22 VITALS
HEIGHT: 67 IN | WEIGHT: 148 LBS | HEART RATE: 70 BPM | DIASTOLIC BLOOD PRESSURE: 81 MMHG | SYSTOLIC BLOOD PRESSURE: 157 MMHG | BODY MASS INDEX: 23.23 KG/M2

## 2021-03-22 VITALS — TEMPERATURE: 97.5 F

## 2021-03-22 PROCEDURE — 64405 NJX AA&/STRD GR OCPL NRV: CPT

## 2021-03-22 PROCEDURE — 99072 ADDL SUPL MATRL&STAF TM PHE: CPT

## 2021-03-22 PROCEDURE — 99213 OFFICE O/P EST LOW 20 MIN: CPT | Mod: 25

## 2021-03-28 NOTE — HISTORY OF PRESENT ILLNESS
[FreeTextEntry1] : 61 yo RH female ho neck pain for 20 years for many years near foramen magnum requiring surgical removal in 2013/ Dx meningioma. required second surgery for repair of fluid leak. Since then - patient co persistent neck pain.Patient is non radiating in the cervical region. Swims to keep healthy. Rest and non distraction increase the pain. Often will awake with pain and needs to move neck in different positions.\par \par Only tried OTC treatments without success. \par \par

## 2021-03-28 NOTE — PROCEDURE
[FreeTextEntry1] : Consent signed. Occipital nerve block performed bilaterally...done using a 27 and 1.25inch needle using 1%Lidocaine and 10mg of Depomedrol for a total of 2cc. Patient observed for 5mins following procedure. No complications.\par

## 2021-03-28 NOTE — PHYSICAL EXAM
[General Appearance - Well Nourished] : well nourished [Affect] : the affect was normal [Person] : oriented to person [Place] : oriented to place [Time] : oriented to time [Cranial Nerves Oculomotor (III)] : extraocular motion intact [Sclera] : the sclera and conjunctiva were normal [Outer Ear] : the ears and nose were normal in appearance [FreeTextEntry1] : bilateral occipital tenderness to palpation [] : no rash

## 2021-06-10 ENCOUNTER — APPOINTMENT (OUTPATIENT)
Dept: ORTHOPEDIC SURGERY | Facility: CLINIC | Age: 61
End: 2021-06-10
Payer: COMMERCIAL

## 2021-06-10 VITALS
HEART RATE: 73 BPM | HEIGHT: 67 IN | DIASTOLIC BLOOD PRESSURE: 81 MMHG | SYSTOLIC BLOOD PRESSURE: 146 MMHG | BODY MASS INDEX: 23.23 KG/M2 | WEIGHT: 148 LBS | OXYGEN SATURATION: 96 %

## 2021-06-10 DIAGNOSIS — M65.311 TRIGGER THUMB, RIGHT THUMB: ICD-10-CM

## 2021-06-10 PROCEDURE — 99072 ADDL SUPL MATRL&STAF TM PHE: CPT

## 2021-06-10 PROCEDURE — 99214 OFFICE O/P EST MOD 30 MIN: CPT | Mod: 25

## 2021-06-10 PROCEDURE — 20550 NJX 1 TENDON SHEATH/LIGAMENT: CPT | Mod: F5

## 2021-08-02 ENCOUNTER — NON-APPOINTMENT (OUTPATIENT)
Age: 61
End: 2021-08-02

## 2021-08-11 ENCOUNTER — APPOINTMENT (OUTPATIENT)
Dept: ORTHOPEDIC SURGERY | Facility: CLINIC | Age: 61
End: 2021-08-11
Payer: COMMERCIAL

## 2021-08-11 DIAGNOSIS — M19.049 PRIMARY OSTEOARTHRITIS, UNSPECIFIED HAND: ICD-10-CM

## 2021-08-11 DIAGNOSIS — G56.01 CARPAL TUNNEL SYNDROME, RIGHT UPPER LIMB: ICD-10-CM

## 2021-08-11 PROCEDURE — 99214 OFFICE O/P EST MOD 30 MIN: CPT | Mod: 25

## 2021-08-11 PROCEDURE — 20526 THER INJECTION CARP TUNNEL: CPT | Mod: RT,59

## 2021-08-11 PROCEDURE — 20550 NJX 1 TENDON SHEATH/LIGAMENT: CPT | Mod: 59,RT

## 2021-08-12 ENCOUNTER — APPOINTMENT (OUTPATIENT)
Dept: PAIN MANAGEMENT | Facility: CLINIC | Age: 61
End: 2021-08-12
Payer: COMMERCIAL

## 2021-08-12 VITALS
BODY MASS INDEX: 23.23 KG/M2 | SYSTOLIC BLOOD PRESSURE: 133 MMHG | HEART RATE: 80 BPM | WEIGHT: 148 LBS | HEIGHT: 67 IN | DIASTOLIC BLOOD PRESSURE: 74 MMHG

## 2021-08-12 PROCEDURE — 99214 OFFICE O/P EST MOD 30 MIN: CPT

## 2021-10-05 ENCOUNTER — TRANSCRIPTION ENCOUNTER (OUTPATIENT)
Age: 61
End: 2021-10-05

## 2021-10-10 ENCOUNTER — NON-APPOINTMENT (OUTPATIENT)
Age: 61
End: 2021-10-10

## 2021-10-14 ENCOUNTER — APPOINTMENT (OUTPATIENT)
Dept: ORTHOPEDIC SURGERY | Facility: CLINIC | Age: 61
End: 2021-10-14
Payer: COMMERCIAL

## 2021-10-14 VITALS
OXYGEN SATURATION: 97 % | BODY MASS INDEX: 23.23 KG/M2 | WEIGHT: 148 LBS | HEIGHT: 67 IN | DIASTOLIC BLOOD PRESSURE: 87 MMHG | SYSTOLIC BLOOD PRESSURE: 135 MMHG | HEART RATE: 66 BPM

## 2021-10-14 DIAGNOSIS — M77.8 OTHER ENTHESOPATHIES, NOT ELSEWHERE CLASSIFIED: ICD-10-CM

## 2021-10-14 PROCEDURE — 20550 NJX 1 TENDON SHEATH/LIGAMENT: CPT | Mod: RT

## 2021-10-14 PROCEDURE — 99214 OFFICE O/P EST MOD 30 MIN: CPT | Mod: 25

## 2021-11-03 ENCOUNTER — APPOINTMENT (OUTPATIENT)
Dept: ORTHOPEDIC SURGERY | Facility: CLINIC | Age: 61
End: 2021-11-03
Payer: COMMERCIAL

## 2021-11-03 VITALS
HEIGHT: 67 IN | BODY MASS INDEX: 23.23 KG/M2 | WEIGHT: 148 LBS | SYSTOLIC BLOOD PRESSURE: 133 MMHG | DIASTOLIC BLOOD PRESSURE: 76 MMHG | HEART RATE: 80 BPM

## 2021-11-03 DIAGNOSIS — M25.561 PAIN IN RIGHT KNEE: ICD-10-CM

## 2021-11-03 DIAGNOSIS — G89.29 PAIN IN RIGHT KNEE: ICD-10-CM

## 2021-11-03 DIAGNOSIS — M25.562 PAIN IN RIGHT KNEE: ICD-10-CM

## 2021-11-03 PROCEDURE — 73564 X-RAY EXAM KNEE 4 OR MORE: CPT | Mod: 50

## 2021-11-03 PROCEDURE — 99214 OFFICE O/P EST MOD 30 MIN: CPT

## 2021-11-04 NOTE — PHYSICAL EXAM
[de-identified] : Well developed, well nourished in no apparent distress, awake, alert and orientated to person, place and time. with appropriate mood and affect. \par Respirations are even and unlabored. Gait evaluation does reveal a limp. There is no inguinal adenopathy. \par The legs are well-perfused, without skin lesions, shows a grossly normal motor and sensory examination. \par Knee motion does not cause significant pain. ROM of both knees are 0-125 degrees. \par The knees are stable within that range-of-motion to AP and ML stress. \par Muscle strength is normal. Pedal pulses are palpable.  [de-identified] : Long standing  knee, AP knee, lateral knee, and patellar views of the bilat  knee were ordered and taken in the office and do not show any arthritic changes or loss of joint space.

## 2021-11-04 NOTE — DISCUSSION/SUMMARY
[de-identified] : The patient has mild bilat knee pain. They are not an appropriate candidate for surgical intervention at this time. An extensive discussion was conducted on the natural history of the disease and the variety of surgical and non-surgical options available to the patient including, but not limited to non-steroidal anti-inflammatory medications, steroid injections, physical therapy, maintenance of ideal body weight, and reduction of activity. I recommended and prescribed a course of Mobic. The patient will schedule an appointment as needed.

## 2021-11-04 NOTE — HISTORY OF PRESENT ILLNESS
[de-identified] : This is a very nice  61 year old  female experiencing pain in both knees which is mild in intensity and has been going on for at least 9 months now. The pain does not limit activities of daily living. Walking tolerance is not reduced. She enjoys swimming. She is here today to see if there is any way she can prevent arthritis. The patient denies any radiation of the pain to the feet and it is not associated with numbness, tingling, or weakness.

## 2021-11-30 NOTE — PATIENT PROFILE ADULT - NSPROGENSOURCEINFO_GEN_A_NUR
Date: 11/30/2021    Time of Call: 10:08 AM     [ TORB ] Ordering provider: Dr. Stephanie Campoverde   Order: Coronary angiogram with possible intervention.      Order received by: KARINE Lopez     Follow-up/additional notes: Will call pt to discuss pre procedure instructions and scheduling for angiogram.     Abhi Freed RN   Cardiology Nurse Coordinator          
family

## 2022-04-11 PROBLEM — Z11.59 SCREENING FOR VIRAL DISEASE: Status: RESOLVED | Noted: 2020-12-09 | Resolved: 2020-12-09

## 2022-08-11 ENCOUNTER — RESULT REVIEW (OUTPATIENT)
Age: 62
End: 2022-08-11

## 2022-10-21 ENCOUNTER — RESULT REVIEW (OUTPATIENT)
Age: 62
End: 2022-10-21

## 2022-10-21 ENCOUNTER — APPOINTMENT (OUTPATIENT)
Dept: MAMMOGRAPHY | Facility: IMAGING CENTER | Age: 62
End: 2022-10-21

## 2022-10-21 ENCOUNTER — OUTPATIENT (OUTPATIENT)
Dept: OUTPATIENT SERVICES | Facility: HOSPITAL | Age: 62
LOS: 1 days | End: 2022-10-21
Payer: COMMERCIAL

## 2022-10-21 ENCOUNTER — APPOINTMENT (OUTPATIENT)
Dept: PAIN MANAGEMENT | Facility: CLINIC | Age: 62
End: 2022-10-21

## 2022-10-21 VITALS
DIASTOLIC BLOOD PRESSURE: 75 MMHG | BODY MASS INDEX: 23.23 KG/M2 | WEIGHT: 148 LBS | HEIGHT: 67 IN | SYSTOLIC BLOOD PRESSURE: 132 MMHG | HEART RATE: 84 BPM

## 2022-10-21 DIAGNOSIS — K82.4 CHOLESTEROLOSIS OF GALLBLADDER: ICD-10-CM

## 2022-10-21 DIAGNOSIS — Z00.00 ENCOUNTER FOR GENERAL ADULT MEDICAL EXAMINATION WITHOUT ABNORMAL FINDINGS: ICD-10-CM

## 2022-10-21 DIAGNOSIS — Z87.898 PERSONAL HISTORY OF OTHER SPECIFIED CONDITIONS: Chronic | ICD-10-CM

## 2022-10-21 PROCEDURE — 20552 NJX 1/MLT TRIGGER POINT 1/2: CPT

## 2022-10-21 PROCEDURE — 77067 SCR MAMMO BI INCL CAD: CPT

## 2022-10-21 PROCEDURE — 77067 SCR MAMMO BI INCL CAD: CPT | Mod: 26

## 2022-10-21 PROCEDURE — 77063 BREAST TOMOSYNTHESIS BI: CPT | Mod: 26

## 2022-10-21 PROCEDURE — 99213 OFFICE O/P EST LOW 20 MIN: CPT | Mod: 25

## 2022-10-21 PROCEDURE — 77063 BREAST TOMOSYNTHESIS BI: CPT

## 2022-10-21 NOTE — ASSESSMENT
[FreeTextEntry1] : This a case of a 62-year-old woman with a history of cervical paraspinal pain and at times involving the occipital nerve bilaterally.  However at this visit, she is only having cervical paraspinal muscle involvement.\par \par We will perform local trigger point injection into the cervical paraspinal muscle region bilaterally. \par \par Follow-up in 4 to 6 weeks.

## 2022-10-21 NOTE — PROCEDURE
[FreeTextEntry1] : Consent signed. Risks and benefits were discussed. Alternative options were also discussed. Patient was in seated position. Skin was prepped and alcohol was placed on area of injection. Cervical paraspinal injection was performed bilaterally using a 27 and 1.25 inch needle using 1%Lidocaine and 20mg of Depomedrol for a total of 5cc. Patient observed for 5 mins following procedure. No complications.\par

## 2022-10-21 NOTE — HISTORY OF PRESENT ILLNESS
[FreeTextEntry1] : This is a case of a 62-year-old female who reports having persistent upper cervical posterior head pain bilaterally.  At last visit, we performed a bilateral occipital nerve block which provided her 1 hour of 100% relief and the pain returned.  She is back to try another injection.\par \par She claims her pain is in the upper cervical region bilaterally not extending to the head at this time.  She denies any new medical problems.

## 2022-10-21 NOTE — PHYSICAL EXAM
[FreeTextEntry1] : Constitutional: No signs of distress or signs of toxicity. \par Mental Status: Alert and well oriented. Speech fluent. No aphasia. Fund of knowledge intact. \par Psychiatric: Mood stable.\par Gait: non antalgic or ataxic.\par Eyes: no redness or swelling\par HEENT: intact; no signs of trauma.\par Neck: No masses noted; cervical paraspinal muscle tenderness bilaterally with spasm.\par Pulmonary: no respiratory distress\par Skin: No rash.\par

## 2022-11-18 ENCOUNTER — APPOINTMENT (OUTPATIENT)
Dept: PAIN MANAGEMENT | Facility: CLINIC | Age: 62
End: 2022-11-18

## 2022-12-13 ENCOUNTER — TRANSCRIPTION ENCOUNTER (OUTPATIENT)
Age: 62
End: 2022-12-13

## 2022-12-14 ENCOUNTER — TRANSCRIPTION ENCOUNTER (OUTPATIENT)
Age: 62
End: 2022-12-14

## 2022-12-15 ENCOUNTER — TRANSCRIPTION ENCOUNTER (OUTPATIENT)
Age: 62
End: 2022-12-15

## 2022-12-21 ENCOUNTER — TRANSCRIPTION ENCOUNTER (OUTPATIENT)
Age: 62
End: 2022-12-21

## 2022-12-22 ENCOUNTER — APPOINTMENT (OUTPATIENT)
Dept: PAIN MANAGEMENT | Facility: CLINIC | Age: 62
End: 2022-12-22

## 2023-01-13 ENCOUNTER — TRANSCRIPTION ENCOUNTER (OUTPATIENT)
Age: 63
End: 2023-01-13

## 2023-01-13 ENCOUNTER — NON-APPOINTMENT (OUTPATIENT)
Age: 63
End: 2023-01-13

## 2023-01-13 ENCOUNTER — APPOINTMENT (OUTPATIENT)
Dept: PHYSICAL MEDICINE AND REHAB | Facility: CLINIC | Age: 63
End: 2023-01-13
Payer: COMMERCIAL

## 2023-01-13 VITALS
DIASTOLIC BLOOD PRESSURE: 79 MMHG | HEART RATE: 92 BPM | OXYGEN SATURATION: 99 % | TEMPERATURE: 96.2 F | SYSTOLIC BLOOD PRESSURE: 127 MMHG

## 2023-01-13 PROCEDURE — 20553 NJX 1/MLT TRIGGER POINTS 3/>: CPT

## 2023-01-13 PROCEDURE — 99204 OFFICE O/P NEW MOD 45 MIN: CPT | Mod: 25

## 2023-01-13 RX ORDER — MELOXICAM 15 MG/1
15 TABLET ORAL DAILY
Qty: 30 | Refills: 1 | Status: DISCONTINUED | COMMUNITY
Start: 2021-02-23 | End: 2023-01-13

## 2023-01-13 RX ORDER — MELOXICAM 15 MG/1
15 TABLET ORAL
Qty: 30 | Refills: 1 | Status: DISCONTINUED | COMMUNITY
Start: 2021-05-05 | End: 2023-01-13

## 2023-01-13 RX ORDER — MELOXICAM 15 MG/1
15 TABLET ORAL DAILY
Qty: 90 | Refills: 3 | Status: DISCONTINUED | COMMUNITY
Start: 2020-12-29 | End: 2023-01-13

## 2023-01-13 RX ORDER — MELOXICAM 15 MG/1
15 TABLET ORAL
Qty: 30 | Refills: 1 | Status: DISCONTINUED | COMMUNITY
Start: 2021-11-03 | End: 2023-01-13

## 2023-01-13 NOTE — CONSULT LETTER
[Dear  ___] : Dear ~ELIDA, [Consult Letter:] : I had the pleasure of evaluating your patient, [unfilled]. [Please see my note below.] : Please see my note below. [Consult Closing:] : Thank you very much for allowing me to participate in the care of this patient.  If you have any questions, please do not hesitate to contact me. [Sincerely,] : Sincerely, [FreeTextEntry2] : Misael Cartagena MD\par [address]\[place] [address] [FreeTextEntry3] : Jessica

## 2023-01-13 NOTE — ASSESSMENT
[FreeTextEntry1] : 62 year old female with neck pain and myofascial pain.  She did tolerate trigger point injections without complaint or complication.  She will follow up with me in one month for repeat injections as necessary.

## 2023-01-13 NOTE — HISTORY OF PRESENT ILLNESS
[Neck] : neck [___ yrs] : [unfilled] year(s) ago [5] : a current pain level of 5/10 [Dull] : dull [Aching] : aching [Bilateral] : bilateral [Shoulder] : shoulder [Laying] : laying [Insomnia] : insomnia [PT] : PT [Injections] : injections [FreeTextEntry1] : Nov. 2012 and Jan. 2013 tumor removal by Dr. Acosta [FreeTextEntry4] : cannot identify [FreeTextEntry6] : had trigger point injections and occipital nerve block by Dr. Cartagena

## 2023-01-13 NOTE — PROCEDURE
[de-identified] : Patient agreed to performing trigger point injections.  Patient was place in the sitting/prone position.  The area was palpated and marked.  After thoroughly cleansing each area with alcohol, a total of 2 cc of 0.25% Bupivicaine was injected using a 27 gauge 1.5 inch needle in a fan-like distribution after negative aspiration.  After each injection, the needle was removed and hemostasis was achieved.  Patient tolerated the procedure well without complaint or complication.\par

## 2023-01-13 NOTE — PHYSICAL EXAM
[Normal] : Deep tendon reflexes were 2+ and symmetric, the sensory exam was normal to light touch and pinprick and no focal deficits [de-identified] : muscle spasm and tenderness over her midline cervical surgical scar

## 2023-01-20 ENCOUNTER — APPOINTMENT (OUTPATIENT)
Dept: PAIN MANAGEMENT | Facility: CLINIC | Age: 63
End: 2023-01-20

## 2023-02-17 ENCOUNTER — APPOINTMENT (OUTPATIENT)
Dept: PHYSICAL MEDICINE AND REHAB | Facility: CLINIC | Age: 63
End: 2023-02-17
Payer: COMMERCIAL

## 2023-02-17 VITALS
DIASTOLIC BLOOD PRESSURE: 76 MMHG | OXYGEN SATURATION: 100 % | TEMPERATURE: 97.1 F | HEART RATE: 74 BPM | SYSTOLIC BLOOD PRESSURE: 135 MMHG

## 2023-02-17 PROCEDURE — 99213 OFFICE O/P EST LOW 20 MIN: CPT | Mod: 25

## 2023-02-17 PROCEDURE — 20553 NJX 1/MLT TRIGGER POINTS 3/>: CPT

## 2023-02-17 NOTE — PROCEDURE
[de-identified] : Patient agreed to performing trigger point injections.  Patient was place in the sitting/prone position.  The area was palpated and marked.  After thoroughly cleansing each area with alcohol, a total of 2 cc of 0.25% Bupivicaine was injected using a 27 gauge 1.5 inch needle in a fan-like distribution after negative aspiration.  After each injection, the needle was removed and hemostasis was achieved.  Patient tolerated the procedure well without complaint or complication.\par

## 2023-02-17 NOTE — ASSESSMENT
[FreeTextEntry1] : 62 year old female with myofascial pain.  She did tolerate trigger point injections without complaint or complication.  She will return to see me in one month for repeat injections as necessary.

## 2023-02-17 NOTE — HISTORY OF PRESENT ILLNESS
[FreeTextEntry1] : Patient returns after being seen last month.  She is here for repeat trigger point injections.

## 2023-03-01 ENCOUNTER — APPOINTMENT (OUTPATIENT)
Dept: NEUROLOGY | Facility: CLINIC | Age: 63
End: 2023-03-01

## 2023-03-17 ENCOUNTER — APPOINTMENT (OUTPATIENT)
Dept: PHYSICAL MEDICINE AND REHAB | Facility: CLINIC | Age: 63
End: 2023-03-17
Payer: COMMERCIAL

## 2023-03-17 VITALS
SYSTOLIC BLOOD PRESSURE: 135 MMHG | OXYGEN SATURATION: 97 % | DIASTOLIC BLOOD PRESSURE: 78 MMHG | TEMPERATURE: 96.7 F | HEART RATE: 71 BPM

## 2023-03-17 DIAGNOSIS — M79.18 MYALGIA, OTHER SITE: ICD-10-CM

## 2023-03-17 PROCEDURE — 99213 OFFICE O/P EST LOW 20 MIN: CPT | Mod: 25

## 2023-03-17 PROCEDURE — 20553 NJX 1/MLT TRIGGER POINTS 3/>: CPT

## 2023-03-17 NOTE — HISTORY OF PRESENT ILLNESS
[FreeTextEntry1] : Patient returns after her trigger point injections last month.  Unfortunately she states this past month, her pain has been worse than it's ever been.  She is here to discuss her treatment options.

## 2023-03-17 NOTE — ASSESSMENT
[FreeTextEntry1] : 62 year old female with neck pain and myofascial pain and possible cervical spondylosis.  She did tolerate trigger point injections without complaint or complication.  Given the nature of the patient's complaints and lack of significant improvement following more conservative measures, we will obtain MRI cervical spine to help delineate the exact etiology of the patient's pain.  The patient will return to see me once the study is complete so that we may review the results and discuss further treatment options.  \par

## 2023-03-17 NOTE — PROCEDURE
[de-identified] : Patient agreed to performing trigger point injections.  Patient was place in the sitting/prone position.  The area was palpated and marked.  After thoroughly cleansing each area with alcohol, a total of 2 cc of 0.25% Bupivicaine was injected using a 27 gauge 1.5 inch needle in a fan-like distribution after negative aspiration.  After each injection, the needle was removed and hemostasis was achieved.  Patient tolerated the procedure well without complaint or complication.\par

## 2023-04-18 ENCOUNTER — APPOINTMENT (OUTPATIENT)
Dept: MRI IMAGING | Facility: CLINIC | Age: 63
End: 2023-04-18

## 2023-05-09 ENCOUNTER — OUTPATIENT (OUTPATIENT)
Dept: OUTPATIENT SERVICES | Facility: HOSPITAL | Age: 63
LOS: 1 days | End: 2023-05-09
Payer: COMMERCIAL

## 2023-05-09 ENCOUNTER — APPOINTMENT (OUTPATIENT)
Dept: MRI IMAGING | Facility: CLINIC | Age: 63
End: 2023-05-09
Payer: COMMERCIAL

## 2023-05-09 DIAGNOSIS — M54.2 CERVICALGIA: ICD-10-CM

## 2023-05-09 DIAGNOSIS — Z87.898 PERSONAL HISTORY OF OTHER SPECIFIED CONDITIONS: Chronic | ICD-10-CM

## 2023-05-09 PROCEDURE — 72141 MRI NECK SPINE W/O DYE: CPT | Mod: 26

## 2023-05-09 PROCEDURE — 72141 MRI NECK SPINE W/O DYE: CPT

## 2023-05-25 ENCOUNTER — TRANSCRIPTION ENCOUNTER (OUTPATIENT)
Age: 63
End: 2023-05-25

## 2023-05-30 ENCOUNTER — APPOINTMENT (OUTPATIENT)
Dept: PHYSICAL MEDICINE AND REHAB | Facility: CLINIC | Age: 63
End: 2023-05-30
Payer: COMMERCIAL

## 2023-05-30 DIAGNOSIS — M54.2 CERVICALGIA: ICD-10-CM

## 2023-05-30 PROCEDURE — 99213 OFFICE O/P EST LOW 20 MIN: CPT

## 2023-05-30 NOTE — ASSESSMENT
[FreeTextEntry1] : 62 year old female with neck pain and cervical facet syndrome.  We did discuss starting a prescription-strength anti-inflammatory medications.  We will begin a trial of Meloxicam.

## 2023-05-30 NOTE — HISTORY OF PRESENT ILLNESS
[FreeTextEntry1] : Patient returns after completing MRI cervical spine.  She is here to review the results.  The trigger point injections have not afforded her significant relief of her pain.  She reports pain radiating to both shoulders but not down her arms.

## 2023-05-30 NOTE — DATA REVIEWED
[MRI] : MRI [FreeTextEntry1] : \par   MR Cervical Spine No Cont             Final\par \par No Documents Attached\par \par \par \par \par   EXAM: [id number] - MR SPINE CERVICAL  - ORDERED BY: DALY NEGRETE\par \par \par PROCEDURE DATE:  05/09/2023\par \par \par \par INTERPRETATION:  CERVICAL SPINE MRI\par \par CLINICAL INFORMATION: Neck pain\par TECHNIQUE: Multiplanar, multisequence MRI was obtained of the cervical spine.\par \par FINDINGS:\par \par DISC LEVEL EVALUATION:\par \par C1/2: Mild to moderate arthrosis between the dens and the anterior arch of C1.\par C2/C3: Normal\par C3/C4: Mild disc bulging and small endplate osteophyte formation. Minimal foraminal narrowing.\par C4/C5: Mild disc height loss with minimal disc bulging and small endplate osteophyte formation. Minimal foraminal narrowing.\par C5/C6: Small posterior disc protrusion that mildly indents the ventral thecal sac without contact upon the cord. Mild foraminal endplate osteophyte formation. Mild foraminal narrowing.\par C6/C7: Mild disc height loss with mild circumferential disc bulging and small osteophyte formation. Mild foraminal narrowing. Mild endplate marrow edema.\par C7/T1: Moderate to severe left facet arthrosis is bone marrow edema. No spinal canal or foraminal narrowing.\par \par ALIGNMENT: Trace anterolisthesis at C7-T1.\par CORD: No cord signal abnormality.\par MARROW: Mild endplate marrow edema at C6-C7. Marked bone marrow edema within the bones of the left-sided C7-T1 facet joint.\par IMAGED BRAIN: Normal\par PERIPHERAL/NECK SOFT TISSUES: Normal\par \par IMPRESSION: Mild to moderate multilevel cervical degenerative disc disease.\par \par Moderate to severe left facet arthrosis at C7-T1 with associated bone marrow edema.\par \par --- End of Report ---\par \par \par \par \par \par \par HALLIE GUAMAN MD; Attending Radiologist\par This document has been electronically signed. May 15 2023  1:23PM\par \par  \par \par  Ordered by: DALY NEGRETE       Collected/Examined: [id number] 03:59PM       \par Verified by: DALY NEGRETE [id number] 01:40PM       \par  Result Communication: Schedule appointment to discuss results;\par Stage: Final       \par  Performed at: [facility]       Resulted: [id number] 01:18PM       Last Updated: 15May2023 01:40PM       Accession: [id number]

## 2023-07-05 ENCOUNTER — LABORATORY RESULT (OUTPATIENT)
Age: 63
End: 2023-07-05

## 2023-07-05 ENCOUNTER — APPOINTMENT (OUTPATIENT)
Dept: INTERNAL MEDICINE | Facility: CLINIC | Age: 63
End: 2023-07-05

## 2023-07-05 ENCOUNTER — NON-APPOINTMENT (OUTPATIENT)
Age: 63
End: 2023-07-05

## 2023-07-05 ENCOUNTER — APPOINTMENT (OUTPATIENT)
Dept: INTERNAL MEDICINE | Facility: CLINIC | Age: 63
End: 2023-07-05
Payer: COMMERCIAL

## 2023-07-05 VITALS
HEIGHT: 67.5 IN | OXYGEN SATURATION: 98 % | TEMPERATURE: 97.8 F | DIASTOLIC BLOOD PRESSURE: 70 MMHG | SYSTOLIC BLOOD PRESSURE: 120 MMHG | WEIGHT: 147 LBS | BODY MASS INDEX: 22.8 KG/M2

## 2023-07-05 DIAGNOSIS — M54.2 CERVICALGIA: ICD-10-CM

## 2023-07-05 DIAGNOSIS — K82.4 CHOLESTEROLOSIS OF GALLBLADDER: ICD-10-CM

## 2023-07-05 DIAGNOSIS — R53.83 OTHER FATIGUE: ICD-10-CM

## 2023-07-05 DIAGNOSIS — Z00.00 ENCOUNTER FOR GENERAL ADULT MEDICAL EXAMINATION W/OUT ABNORMAL FINDINGS: ICD-10-CM

## 2023-07-05 DIAGNOSIS — R41.3 OTHER AMNESIA: ICD-10-CM

## 2023-07-05 DIAGNOSIS — G89.29 CERVICALGIA: ICD-10-CM

## 2023-07-05 DIAGNOSIS — M47.812 SPONDYLOSIS W/OUT MYELOPATHY OR RADICULOPATHY, CERVICAL REGION: ICD-10-CM

## 2023-07-05 LAB
APPEARANCE: CLEAR
BASOPHILS # BLD AUTO: 0.03 K/UL
BASOPHILS NFR BLD AUTO: 0.5 %
BILIRUBIN URINE: NEGATIVE
BLOOD URINE: NEGATIVE
COLOR: YELLOW
EOSINOPHIL # BLD AUTO: 0.08 K/UL
EOSINOPHIL NFR BLD AUTO: 1.3 %
ERYTHROCYTE [SEDIMENTATION RATE] IN BLOOD BY WESTERGREN METHOD: 16 MM/HR
GLUCOSE QUALITATIVE U: NEGATIVE MG/DL
HCT VFR BLD CALC: 46.9 %
HGB BLD-MCNC: 14.4 G/DL
IMM GRANULOCYTES NFR BLD AUTO: 0.2 %
KETONES URINE: NEGATIVE MG/DL
LEUKOCYTE ESTERASE URINE: NEGATIVE
LYMPHOCYTES # BLD AUTO: 1.81 K/UL
LYMPHOCYTES NFR BLD AUTO: 30.5 %
MAN DIFF?: NORMAL
MCHC RBC-ENTMCNC: 26.4 PG
MCHC RBC-ENTMCNC: 30.7 GM/DL
MCV RBC AUTO: 85.9 FL
MONOCYTES # BLD AUTO: 0.46 K/UL
MONOCYTES NFR BLD AUTO: 7.7 %
NEUTROPHILS # BLD AUTO: 3.55 K/UL
NEUTROPHILS NFR BLD AUTO: 59.8 %
NITRITE URINE: NEGATIVE
PH URINE: 6
PLATELET # BLD AUTO: 294 K/UL
PROTEIN URINE: NEGATIVE MG/DL
RBC # BLD: 5.46 M/UL
RBC # FLD: 14.6 %
SPECIFIC GRAVITY URINE: 1.03
UROBILINOGEN URINE: 0.2 MG/DL
WBC # FLD AUTO: 5.94 K/UL

## 2023-07-05 PROCEDURE — 99214 OFFICE O/P EST MOD 30 MIN: CPT | Mod: 25

## 2023-07-05 PROCEDURE — 93000 ELECTROCARDIOGRAM COMPLETE: CPT | Mod: 59

## 2023-07-05 PROCEDURE — G0444 DEPRESSION SCREEN ANNUAL: CPT | Mod: 59

## 2023-07-05 PROCEDURE — 99396 PREV VISIT EST AGE 40-64: CPT | Mod: 25

## 2023-07-05 PROCEDURE — 36415 COLL VENOUS BLD VENIPUNCTURE: CPT

## 2023-07-05 NOTE — PHYSICAL EXAM
[No Acute Distress] : no acute distress [Well Nourished] : well nourished [PERRL] : pupils equal round and reactive to light [Normal Oropharynx] : the oropharynx was normal [Normal TMs] : both tympanic membranes were normal [Normal Nasal Mucosa] : the nasal mucosa was normal [Supple] : supple [Thyroid Normal, No Nodules] : the thyroid was normal and there were no nodules present [No Accessory Muscle Use] : no accessory muscle use [Clear to Auscultation] : lungs were clear to auscultation bilaterally [Regular Rhythm] : with a regular rhythm [Normal S1, S2] : normal S1 and S2 [No Murmur] : no murmur heard [No Edema] : there was no peripheral edema [Normal Appearance] : normal in appearance [No Nipple Discharge] : no nipple discharge [No Axillary Lymphadenopathy] : no axillary lymphadenopathy [Soft] : abdomen soft [Non Tender] : non-tender [Non-distended] : non-distended [No Masses] : no abdominal mass palpated [No HSM] : no HSM [Normal Bowel Sounds] : normal bowel sounds [Normal Supraclavicular Nodes] : no supraclavicular lymphadenopathy [Normal Axillary Nodes] : no axillary lymphadenopathy [Normal Posterior Cervical Nodes] : no posterior cervical lymphadenopathy [Normal Anterior Cervical Nodes] : no anterior cervical lymphadenopathy [Normal Inguinal Nodes] : no inguinal lymphadenopathy [Normal Femoral Nodes] : no femoral lymphadenopathy [No Spinal Tenderness] : no spinal tenderness [No Focal Deficits] : no focal deficits [Normal Gait] : normal gait [Normal Affect] : the affect was normal [Normal Insight/Judgement] : insight and judgment were intact

## 2023-07-05 NOTE — ASSESSMENT
[FreeTextEntry1] : 62F c past medical history is significant for meningioma s/p resection in 2012. Meningioma was discovered because pt had chronic ha beginning at age 39.Since meningioma resection in 2012 she continues to have chronic HA and neck pain, also history of left schwannoma, paraesophageal hernia repair & Nissen fundoplication (2019), gallbladder polyp here for cpe\par \par GHM - check fasting BW \par physical ecg performed - ekg nsr \par utd with optho and dental exam  routine optho exam\par pt will schedule routine gyn exam/pap smear\par UTD with screening mammogram 10/22\par she states she is utd with screening colonscopy - she will check with Dr. Gallegos's office to see when she is due\par advised to go for us abdomen for Gallbladderr polyp \par advused to go for us thyroid to r/o thyroid nodule\par advised to go for CXR \par advised FBSE with derm \par advised to hold off on exercise until she see cardiology \par MMSE 26/30 with normal clock drawing test - pt defer additional testing - to observe & assess annually \par \par \par \par \par rtc in 1 month after starting gabapentin\par \par  \par

## 2023-07-05 NOTE — REVIEW OF SYSTEMS
[Fever] : no fever [Chills] : no chills [Hot Flashes] : no hot flashes [Night Sweats] : no night sweats [Recent Change In Weight] : ~T no recent weight change [Abdominal Pain] : no abdominal pain [Nausea] : no nausea [Constipation] : no constipation [Vomiting] : no vomiting [Heartburn] : no heartburn [Melena] : no melena [Dizziness] : no dizziness [FreeTextEntry7] : chronic loose stool - states colonscopy & GI w/u normal

## 2023-07-05 NOTE — PHYSICAL EXAM
[de-identified] : MMSE 26/30 - difficulty with spelling WORLD backwards; normal clock drawing test

## 2023-07-05 NOTE — HISTORY OF PRESENT ILLNESS
[FreeTextEntry1] : \par CPE  [de-identified] : Diet: Good\par Exercise: swimming 3-4 x per week \par \par Fatigue x >1 year -sleeps 11PM to 7am \par 2 weeks ago felt dizzy while swimming "but did fight it" but finished 1/2 hours \par no CP or palpitations \par no SOB or STOKES \par \par chronic neck pain since brain surgery - trigger point injection didn't work - nsaid didn't "really work" - has see physiatrist w/o relief - has seen neurologist as well as pain management without relief - never tried gabapentin \par \par 2 weeks ago while swimming - felt fatigue a/w dizziness \par states feels tired - can take naps \par sleeps from 11pm to 7am but does get up once at night to urinate\par \par 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

## 2023-07-06 ENCOUNTER — TRANSCRIPTION ENCOUNTER (OUTPATIENT)
Age: 63
End: 2023-07-06

## 2023-07-17 ENCOUNTER — TRANSCRIPTION ENCOUNTER (OUTPATIENT)
Age: 63
End: 2023-07-17

## 2023-07-17 LAB
ANA SER IF-ACNC: NEGATIVE
CRP SERPL-MCNC: <3 MG/L
ENA RNP AB SER IA-ACNC: <0.2 AL
ENA SM AB SER IA-ACNC: <0.2 AL
ENA SS-A AB SER IA-ACNC: <0.2 AL
ENA SS-B AB SER IA-ACNC: <0.2 AL
FERRITIN SERPL-MCNC: 63 NG/ML
FOLATE SERPL-MCNC: >20 NG/ML
IRON SATN MFR SERPL: 29 %
IRON SERPL-MCNC: 109 UG/DL
RIBOSOMAL P AB SER IA-ACNC: <0.2 AL
T4 FREE SERPL-MCNC: 1.2 NG/DL
TIBC SERPL-MCNC: 373 UG/DL
TSH SERPL-ACNC: 2.51 UIU/ML
UIBC SERPL-MCNC: 264 UG/DL
VIT B12 SERPL-MCNC: 1138 PG/ML

## 2023-07-18 ENCOUNTER — TRANSCRIPTION ENCOUNTER (OUTPATIENT)
Age: 63
End: 2023-07-18

## 2023-07-18 LAB
25(OH)D3 SERPL-MCNC: 25.8 NG/ML
ALBUMIN SERPL ELPH-MCNC: 4.4 G/DL
ALP BLD-CCNC: 77 U/L
ALT SERPL-CCNC: 16 U/L
ANION GAP SERPL CALC-SCNC: 9 MMOL/L
AST SERPL-CCNC: 14 U/L
BILIRUB SERPL-MCNC: 0.4 MG/DL
BUN SERPL-MCNC: 18 MG/DL
CALCIUM SERPL-MCNC: 9.9 MG/DL
CHLORIDE SERPL-SCNC: 104 MMOL/L
CHOLEST SERPL-MCNC: 236 MG/DL
CO2 SERPL-SCNC: 27 MMOL/L
CREAT SERPL-MCNC: 0.76 MG/DL
DSDNA AB SER-ACNC: 38 IU/ML
EGFR: 88 ML/MIN/1.73M2
ESTIMATED AVERAGE GLUCOSE: 120 MG/DL
GLUCOSE SERPL-MCNC: 106 MG/DL
HBA1C MFR BLD HPLC: 5.8 %
HDLC SERPL-MCNC: 76 MG/DL
LDLC SERPL CALC-MCNC: 138 MG/DL
NONHDLC SERPL-MCNC: 159 MG/DL
POTASSIUM SERPL-SCNC: 5.4 MMOL/L
PROT SERPL-MCNC: 7.2 G/DL
RHEUMATOID FACT SER QL: 17 IU/ML
SODIUM SERPL-SCNC: 140 MMOL/L
TRIGL SERPL-MCNC: 106 MG/DL

## 2023-07-28 ENCOUNTER — TRANSCRIPTION ENCOUNTER (OUTPATIENT)
Age: 63
End: 2023-07-28

## 2023-07-31 ENCOUNTER — TRANSCRIPTION ENCOUNTER (OUTPATIENT)
Age: 63
End: 2023-07-31

## 2023-11-21 ENCOUNTER — TRANSCRIPTION ENCOUNTER (OUTPATIENT)
Age: 63
End: 2023-11-21

## 2023-11-24 ENCOUNTER — TRANSCRIPTION ENCOUNTER (OUTPATIENT)
Age: 63
End: 2023-11-24

## 2024-01-17 ENCOUNTER — APPOINTMENT (OUTPATIENT)
Dept: OTOLARYNGOLOGY | Facility: CLINIC | Age: 64
End: 2024-01-17

## 2024-01-25 ENCOUNTER — APPOINTMENT (OUTPATIENT)
Dept: OTOLARYNGOLOGY | Facility: CLINIC | Age: 64
End: 2024-01-25
Payer: COMMERCIAL

## 2024-01-25 ENCOUNTER — NON-APPOINTMENT (OUTPATIENT)
Age: 64
End: 2024-01-25

## 2024-01-25 VITALS — HEIGHT: 67.5 IN | BODY MASS INDEX: 22.8 KG/M2 | WEIGHT: 147 LBS

## 2024-01-25 DIAGNOSIS — D33.3 BENIGN NEOPLASM OF CRANIAL NERVES: ICD-10-CM

## 2024-01-25 DIAGNOSIS — M50.90 CERVICAL DISC DISORDER, UNSPECIFIED, UNSPECIFIED CERVICAL REGION: ICD-10-CM

## 2024-01-25 PROCEDURE — 31231 NASAL ENDOSCOPY DX: CPT

## 2024-01-25 PROCEDURE — 99204 OFFICE O/P NEW MOD 45 MIN: CPT | Mod: 25

## 2024-01-26 NOTE — ASSESSMENT
[FreeTextEntry1] : Chr congestion  DNS and Rhinitis Rx   Steam humidification and hypertonic saline nasal irrigations  rx-Bactrin and Afrin (3 days)   GERD- Antireflux recommendations were given to the patient Maalox and Omeprazole prescribed A formal GI evaluation may be needed and was discussed with the patient.   f/u 10 days

## 2024-01-26 NOTE — PROCEDURE
[FreeTextEntry6] :  Anterior Rhinoscopy insufficient to account for symptoms.  After informed verbal consent is obtained, the fiberoptic nasal endoscope #3 is passed via the right nasal cavity. The following anatomic sites were directly examined in a sequential fashion: The scope was introduced in both  nasal passages between the middle and inferior turbinates to exam the inferior portion of the middle meatus and the fontanelle, as well as the maxillary ostia.  Next, the scope was passed medially and posteriorly to the middle turbinates to examine the sphenoethmoid recess and the superior turbinate region.   There is [ 0 ]% obstruction of the nasopharynx with adenoid tissue.  A deviated nasal septum was noted causing obstruction. The turbinates were congested-bilateral.  Right Side: 	Mucosa: wnl 	Mucous: none 	Polyp: none 	Inferior Turbinate: sl congested 	Middle Turbinate:sl congested 	Superior Turbinate: wnl 	Inferior Meatus:clear 	Middle Meatus: clear 	Super Meatus: clear 	Sphenoethmoidal Recess: wnl    Left Side: 	Mucosa: wnl 	Mucous:none 	Polyp: none 	Inferior Turbinate: sl congested 	Middle Turbinate: sl congested 	Superior Turbinate:wnl 	Inferior Meatus: clear 	Middle Meatus: clear 	Super Meatus:clear 	Sphenoethmoidal Recess: wnl   [de-identified] : Reason for the procedure-throat symptoms not adequately evaluated by mirror exam After informed verbal consent is obtained.  The fiberoptic laryngoscope #  is passed via the  nasal cavity. There is no adenoid hypertrophy of the nasopharynx.    Tongue Base-wnl  Larynx-wnl Hypopharynx-wnl  tongue base,  vallecular-wnl epiglottis-wnl subglottis-wnl posterior pharyngeal wall-wnl  true vocal cords-wnl arytenoids-erythema and edema false vocal cords-wnl ventricles-wnl  pyriform sinus-wnl no pooling aryepiglottic folds-wnl

## 2024-01-26 NOTE — HISTORY OF PRESENT ILLNESS
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

## 2024-01-26 NOTE — PHYSICAL EXAM
[] : septum deviated bilaterally [Nasal Endoscopy Performed] : nasal endoscopy was performed, see procedure section for findings [de-identified] : congested [de-identified] : copious [Laryngoscopy Performed] : laryngoscopy was performed, see procedure section for findings [Midline] : trachea located in midline position [Normal] : no rashes

## 2024-02-01 ENCOUNTER — NON-APPOINTMENT (OUTPATIENT)
Age: 64
End: 2024-02-01

## 2024-02-05 ENCOUNTER — APPOINTMENT (OUTPATIENT)
Dept: OTOLARYNGOLOGY | Facility: CLINIC | Age: 64
End: 2024-02-05
Payer: COMMERCIAL

## 2024-02-05 VITALS — WEIGHT: 147 LBS | BODY MASS INDEX: 22.8 KG/M2 | HEIGHT: 67.5 IN

## 2024-02-05 DIAGNOSIS — J34.2 DEVIATED NASAL SEPTUM: ICD-10-CM

## 2024-02-05 DIAGNOSIS — J31.0 CHRONIC RHINITIS: ICD-10-CM

## 2024-02-05 DIAGNOSIS — K14.6 GLOSSODYNIA: ICD-10-CM

## 2024-02-05 DIAGNOSIS — K21.9 GASTRO-ESOPHAGEAL REFLUX DISEASE W/OUT ESOPHAGITIS: ICD-10-CM

## 2024-02-05 DIAGNOSIS — H90.3 SENSORINEURAL HEARING LOSS, BILATERAL: ICD-10-CM

## 2024-02-05 PROCEDURE — 99214 OFFICE O/P EST MOD 30 MIN: CPT

## 2024-02-05 NOTE — PHYSICAL EXAM
[Midline] : trachea located in midline position [de-identified] : dry and cracked [Normal] : no rashes

## 2024-02-05 NOTE — END OF VISIT
[FreeTextEntry3] : I personally saw and examined ENE FISCHER in detail. I spoke to DAPHNE Baca regarding the assessment and plan of care. I reviewed the above assessment and plan of care, and agree. I have made changes in changes in the body of the note where appropriate.I personally reviewed the HPI, PMH, FH, SH, ROS and medications/allergies. I have spoken to DAPHNE Baca regarding the history and have personally determined the assessment and plan of care, and documented this myself. I was present and participated in all key portions of the encounter and all procedures noted above. I have made changes in the body of the note where appropriate.   Attesting Faculty: See Attending Signature Below

## 2024-02-05 NOTE — ASSESSMENT
[FreeTextEntry1] : Nasal congestion and sinus pain now much improved: - Continue hypertonic saline - Humidifier by bed at night.   LPRD:  Feels throat symptoms worsened with PPI and Maalox: - will need to f/u with GI for further management.  Tongue pain- likely due to dry mouth while at night, mouth breathing with nasal congestion: - Try sugarless sucking candy. - Vitamin B12. - humidifier by bed at night.  r/o thrush  Will call on Monday to discuss how she is doing.

## 2024-02-05 NOTE — HISTORY OF PRESENT ILLNESS
[de-identified] : 62 y/o F with f/u for nasal congestion and rhinitis.  Advised to do hypertonic saline and started on bactrim.  She has now completed abx.  Nasal symptoms feel better, however not resolved.  Also gets fullness sensation in the ears that is improved with Jaw movement.  Also noted to have LPRD, Given reflux precautions, Advised to use Maalox and Omeprazole was prescribed.  She feels that her throat symptoms are worse now than before.  No change in voice, no trouble eating or drinking.  Does have large sensation of phlegm in the morning that makes it difficult to swallow until she has some water and brushes her teeth.  NOw also with new symptoms of "cutup" looking tongue.  She feels like her tongue is "broken" and is not able to tolerate anything tart or sour, or acitis. No difficulty with bland foods and able to eart bread without a problem.   Pos tongue dryness.  Notes she drinks lots of water.

## 2024-02-12 ENCOUNTER — NON-APPOINTMENT (OUTPATIENT)
Age: 64
End: 2024-02-12

## 2024-02-27 ENCOUNTER — RX RENEWAL (OUTPATIENT)
Age: 64
End: 2024-02-27

## 2024-03-15 ENCOUNTER — APPOINTMENT (OUTPATIENT)
Dept: SURGERY | Facility: CLINIC | Age: 64
End: 2024-03-15
Payer: COMMERCIAL

## 2024-03-15 VITALS
RESPIRATION RATE: 17 BRPM | TEMPERATURE: 97.2 F | WEIGHT: 151 LBS | DIASTOLIC BLOOD PRESSURE: 86 MMHG | BODY MASS INDEX: 23.42 KG/M2 | SYSTOLIC BLOOD PRESSURE: 132 MMHG | OXYGEN SATURATION: 95 % | HEIGHT: 67.5 IN | HEART RATE: 81 BPM

## 2024-03-15 PROCEDURE — 99203 OFFICE O/P NEW LOW 30 MIN: CPT

## 2024-03-15 RX ORDER — ACETAMINOPHEN 325 MG/1
325 TABLET ORAL EVERY 6 HOURS
Refills: 0 | Status: DISCONTINUED | COMMUNITY
Start: 2020-12-09 | End: 2024-03-15

## 2024-03-15 RX ORDER — BACILLUS COAGULANS/INULIN 1B-250 MG
CAPSULE ORAL
Refills: 0 | Status: ACTIVE | COMMUNITY

## 2024-03-15 RX ORDER — ASPIRIN 81 MG
81 TABLET, DELAYED RELEASE (ENTERIC COATED) ORAL
Refills: 0 | Status: ACTIVE | COMMUNITY

## 2024-03-15 RX ORDER — SULFAMETHOXAZOLE AND TRIMETHOPRIM 800; 160 MG/1; MG/1
800-160 TABLET ORAL TWICE DAILY
Qty: 20 | Refills: 2 | Status: DISCONTINUED | COMMUNITY
Start: 2024-01-25 | End: 2024-03-15

## 2024-03-15 RX ORDER — IBUPROFEN 400 MG/1
400 TABLET, FILM COATED ORAL
Qty: 75 | Refills: 0 | Status: DISCONTINUED | COMMUNITY
Start: 2020-12-09 | End: 2024-03-15

## 2024-03-15 RX ORDER — MELOXICAM 7.5 MG/1
7.5 TABLET ORAL TWICE DAILY
Qty: 60 | Refills: 3 | Status: DISCONTINUED | COMMUNITY
Start: 2023-05-30 | End: 2024-03-15

## 2024-03-15 RX ORDER — NITROGLYCERIN 4 MG/G
0.4 OINTMENT RECTAL
Qty: 30 | Refills: 1 | Status: ACTIVE | COMMUNITY
Start: 2024-03-15 | End: 1900-01-01

## 2024-03-15 RX ORDER — NITROGLYCERIN 20 MG/G
2 OINTMENT TOPICAL
Qty: 1 | Refills: 0 | Status: ACTIVE | COMMUNITY
Start: 2024-03-15 | End: 1900-01-01

## 2024-03-15 NOTE — PHYSICAL EXAM
[Normal Breath Sounds] : Normal breath sounds [Normal Heart Sounds] : normal heart sounds [Alert] : alert [Oriented to Person] : oriented to person [Oriented to Place] : oriented to place [Calm] : calm [Oriented to Time] : oriented to time [Abdomen Tenderness] : ~T No ~M abdominal tenderness [de-identified] : Acute left lateral anal fissure.  There is no fistula or abscess.  Anal rectal tone is increased.  Digital rectal examination reveals sphincter spasm.  Anoscopy was deferred due to her level of discomfort. [de-identified] : WNL [de-identified] : WNL [de-identified] : MORALESL [de-identified] : WNL ROM [de-identified] : WNL

## 2024-03-15 NOTE — ASSESSMENT
[FreeTextEntry1] : In summary the patient has an acute left lateral anal fissure.  I instituted treatment with topical nitroglycerin.  I instructed her to call me in 2 to 3 weeks to let me know how she is doing.  If her symptoms do not improve with nitroglycerin I would recommend Botox injection.  I explained that the final option would be an internal sphincterotomy if her symptoms do not improve with conservative treatment.

## 2024-03-15 NOTE — CONSULT LETTER
[Dear  ___] : Dear  [unfilled], [Consult Letter:] : I had the pleasure of evaluating your patient, [unfilled]. [Please see my note below.] : Please see my note below. [Consult Closing:] : Thank you very much for allowing me to participate in the care of this patient.  If you have any questions, please do not hesitate to contact me. [Sincerely,] : Sincerely, [FreeTextEntry3] : Misael Cantor M.D., F.A.C.S, F.A.S.C.R.S [DrAnkur  ___] : Dr. STANFORD

## 2024-03-15 NOTE — HISTORY OF PRESENT ILLNESS
[FreeTextEntry1] : Milla is a 64 y/o female being seen for consultation, thrombosed hemorrhoid.  Colonoscopy on 6/15/18 - Diverticulosis of large intestine. Internal hemorrhoids.   Today pt reports anal pain. Daily 4 times BMs, loose, no straining, no routine use of fiber supplements/laxatives, has pain for a week, no bleeding, no episodes of incontinence, and does feel swollen tissue. Used prep-H and lidocaine with no relief. Takes baby aspirin, has an acoustic neuroma.

## 2024-04-09 RX ORDER — ONABOTULINUMTOXINA 100 [USP'U]/1
100 INJECTION, POWDER, LYOPHILIZED, FOR SOLUTION INTRADERMAL; INTRAMUSCULAR
Qty: 1 | Refills: 0 | Status: ACTIVE | COMMUNITY
Start: 2024-04-09 | End: 1900-01-01

## 2024-04-10 ENCOUNTER — APPOINTMENT (OUTPATIENT)
Dept: INTERNAL MEDICINE | Facility: CLINIC | Age: 64
End: 2024-04-10
Payer: COMMERCIAL

## 2024-04-10 ENCOUNTER — LABORATORY RESULT (OUTPATIENT)
Age: 64
End: 2024-04-10

## 2024-04-10 VITALS
HEIGHT: 67.5 IN | SYSTOLIC BLOOD PRESSURE: 110 MMHG | WEIGHT: 150 LBS | TEMPERATURE: 98.5 F | BODY MASS INDEX: 23.27 KG/M2 | DIASTOLIC BLOOD PRESSURE: 70 MMHG

## 2024-04-10 DIAGNOSIS — M25.50 PAIN IN UNSPECIFIED JOINT: ICD-10-CM

## 2024-04-10 DIAGNOSIS — M25.562 PAIN IN LEFT KNEE: ICD-10-CM

## 2024-04-10 PROCEDURE — G2211 COMPLEX E/M VISIT ADD ON: CPT

## 2024-04-10 PROCEDURE — 36415 COLL VENOUS BLD VENIPUNCTURE: CPT

## 2024-04-10 PROCEDURE — 99214 OFFICE O/P EST MOD 30 MIN: CPT

## 2024-04-10 NOTE — HISTORY OF PRESENT ILLNESS
[FreeTextEntry8] :   cc: arthalgias x 4 weeks  arthalgias in b/l knees (R>L) - b/l shoulders  b/l wrist and DIps and PIPS  no swelling , no erythema of joint  denies any previous URI  maximum pain is 8/10  has to take 2 advil in am when she wakes up   of note had mild elevation of DsNA on CPE 7/23  did not see a rheumatologist  no rash

## 2024-04-10 NOTE — PHYSICAL EXAM
[No Acute Distress] : no acute distress [Well Nourished] : well nourished [No Accessory Muscle Use] : no accessory muscle use [Clear to Auscultation] : lungs were clear to auscultation bilaterally [Regular Rhythm] : with a regular rhythm [Normal S1, S2] : normal S1 and S2 [No Murmur] : no murmur heard [Soft] : abdomen soft [Non Tender] : non-tender [Non-distended] : non-distended [No Masses] : no abdominal mass palpated [No HSM] : no HSM [Normal Bowel Sounds] : normal bowel sounds [No Joint Swelling] : no joint swelling [No Rash] : no rash [No Focal Deficits] : no focal deficits [Normal Affect] : the affect was normal [Normal Insight/Judgement] : insight and judgment were intact [de-identified] : trace L. LE swelling; no edema of R. Leg [de-identified] : +TTP of R. knee and +TTP of LE ; +TTP of b/l wirst and b/l ankles ; +TTP of b/l shoulders; FROM of b/l davon

## 2024-04-12 LAB
ALBUMIN SERPL ELPH-MCNC: 4.3 G/DL
ALP BLD-CCNC: 87 U/L
ALT SERPL-CCNC: 13 U/L
ANA SER IF-ACNC: NEGATIVE
ANION GAP SERPL CALC-SCNC: 14 MMOL/L
APPEARANCE: ABNORMAL
AST SERPL-CCNC: 10 U/L
B BURGDOR AB SER-IMP: ABNORMAL
B BURGDOR IGG+IGM SER QL: 1.08 INDEX
BASOPHILS # BLD AUTO: 0.05 K/UL
BASOPHILS NFR BLD AUTO: 0.5 %
BILIRUB SERPL-MCNC: <0.2 MG/DL
BILIRUBIN URINE: NEGATIVE
BLOOD URINE: NEGATIVE
BUN SERPL-MCNC: 20 MG/DL
CALCIUM SERPL-MCNC: 9.6 MG/DL
CCP AB SER IA-ACNC: 10 UNITS
CHLORIDE SERPL-SCNC: 100 MMOL/L
CO2 SERPL-SCNC: 25 MMOL/L
COLOR: YELLOW
CREAT SERPL-MCNC: 0.71 MG/DL
CRP SERPL-MCNC: 5 MG/L
DSDNA AB SER-ACNC: 3 IU/ML
EGFR: 95 ML/MIN/1.73M2
ENA RNP AB SER IA-ACNC: <0.2 AL
ENA SM AB SER IA-ACNC: <0.2 AL
ENA SS-A AB SER IA-ACNC: <0.2 AL
ENA SS-B AB SER IA-ACNC: <0.2 AL
EOSINOPHIL # BLD AUTO: 0.14 K/UL
EOSINOPHIL NFR BLD AUTO: 1.4 %
ERYTHROCYTE [SEDIMENTATION RATE] IN BLOOD BY WESTERGREN METHOD: 27 MM/HR
ESTIMATED AVERAGE GLUCOSE: 120 MG/DL
GLUCOSE QUALITATIVE U: NEGATIVE MG/DL
GLUCOSE SERPL-MCNC: 87 MG/DL
HBA1C MFR BLD HPLC: 5.8 %
HCT VFR BLD CALC: 43.3 %
HGB BLD-MCNC: 13.1 G/DL
IMM GRANULOCYTES NFR BLD AUTO: 0.4 %
KETONES URINE: NEGATIVE MG/DL
LEUKOCYTE ESTERASE URINE: NEGATIVE
LYMPHOCYTES # BLD AUTO: 2.14 K/UL
LYMPHOCYTES NFR BLD AUTO: 21.4 %
MAN DIFF?: NORMAL
MCHC RBC-ENTMCNC: 25.2 PG
MCHC RBC-ENTMCNC: 30.3 GM/DL
MCV RBC AUTO: 83.3 FL
MONOCYTES # BLD AUTO: 0.66 K/UL
MONOCYTES NFR BLD AUTO: 6.6 %
NEUTROPHILS # BLD AUTO: 6.96 K/UL
NEUTROPHILS NFR BLD AUTO: 69.7 %
NITRITE URINE: NEGATIVE
PH URINE: 5.5
PLATELET # BLD AUTO: 326 K/UL
POTASSIUM SERPL-SCNC: 5.1 MMOL/L
PROT SERPL-MCNC: 7.3 G/DL
PROTEIN URINE: NEGATIVE MG/DL
RBC # BLD: 5.2 M/UL
RBC # FLD: 14.6 %
RF+CCP IGG SER-IMP: NEGATIVE
RHEUMATOID FACT SER QL: 14 IU/ML
RIBOSOMAL P AB SER IA-ACNC: <0.2 AL
SODIUM SERPL-SCNC: 139 MMOL/L
SPECIFIC GRAVITY URINE: 1.03
UROBILINOGEN URINE: 0.2 MG/DL
WBC # FLD AUTO: 9.99 K/UL

## 2024-04-12 RX ORDER — DOXYCYCLINE 100 MG/1
100 CAPSULE ORAL
Qty: 56 | Refills: 0 | Status: ACTIVE | COMMUNITY
Start: 2024-04-12 | End: 1900-01-01

## 2024-04-15 ENCOUNTER — TRANSCRIPTION ENCOUNTER (OUTPATIENT)
Age: 64
End: 2024-04-15

## 2024-04-15 RX ORDER — LIDOCAINE 50 MG/G
5 CREAM TOPICAL
Qty: 1 | Refills: 0 | Status: ACTIVE | COMMUNITY
Start: 2024-04-15 | End: 1900-01-01

## 2024-04-16 ENCOUNTER — APPOINTMENT (OUTPATIENT)
Dept: ULTRASOUND IMAGING | Facility: CLINIC | Age: 64
End: 2024-04-16
Payer: COMMERCIAL

## 2024-04-16 ENCOUNTER — APPOINTMENT (OUTPATIENT)
Dept: RADIOLOGY | Facility: CLINIC | Age: 64
End: 2024-04-16
Payer: COMMERCIAL

## 2024-04-16 ENCOUNTER — OUTPATIENT (OUTPATIENT)
Dept: OUTPATIENT SERVICES | Facility: HOSPITAL | Age: 64
LOS: 1 days | End: 2024-04-16
Payer: COMMERCIAL

## 2024-04-16 DIAGNOSIS — Z00.00 ENCOUNTER FOR GENERAL ADULT MEDICAL EXAMINATION WITHOUT ABNORMAL FINDINGS: ICD-10-CM

## 2024-04-16 DIAGNOSIS — Z87.898 PERSONAL HISTORY OF OTHER SPECIFIED CONDITIONS: Chronic | ICD-10-CM

## 2024-04-16 DIAGNOSIS — M25.562 PAIN IN LEFT KNEE: ICD-10-CM

## 2024-04-16 PROCEDURE — 71046 X-RAY EXAM CHEST 2 VIEWS: CPT | Mod: 26

## 2024-04-16 PROCEDURE — 73562 X-RAY EXAM OF KNEE 3: CPT | Mod: 26,LT

## 2024-04-16 PROCEDURE — 93971 EXTREMITY STUDY: CPT | Mod: 26

## 2024-04-16 PROCEDURE — 76536 US EXAM OF HEAD AND NECK: CPT

## 2024-04-16 PROCEDURE — 76536 US EXAM OF HEAD AND NECK: CPT | Mod: 26

## 2024-04-16 PROCEDURE — 93971 EXTREMITY STUDY: CPT

## 2024-04-16 PROCEDURE — 73562 X-RAY EXAM OF KNEE 3: CPT

## 2024-04-16 PROCEDURE — 71046 X-RAY EXAM CHEST 2 VIEWS: CPT

## 2024-04-17 ENCOUNTER — TRANSCRIPTION ENCOUNTER (OUTPATIENT)
Age: 64
End: 2024-04-17

## 2024-04-17 NOTE — HISTORY OF PRESENT ILLNESS
[FreeTextEntry1] : Milla is a 64 y/o female being seen for a follow-up visit, Botox Injection  Colonoscopy on 6/15/18 - Diverticulosis of large intestine. Internal hemorrhoids.  Last seen 03/15/24 - In summary the patient has an acute left lateral anal fissure. I instituted treatment with topical nitroglycerin. I instructed her to call me in 2 to 3 weeks to let me know how she is doing. If her symptoms do not improve with nitroglycerin I would recommend Botox injection. I explained that the final option would be an internal sphincterotomy if her symptoms do not improve with conservative treatment.

## 2024-04-19 ENCOUNTER — APPOINTMENT (OUTPATIENT)
Dept: SURGERY | Facility: CLINIC | Age: 64
End: 2024-04-19
Payer: COMMERCIAL

## 2024-04-19 VITALS
SYSTOLIC BLOOD PRESSURE: 125 MMHG | TEMPERATURE: 97 F | DIASTOLIC BLOOD PRESSURE: 78 MMHG | BODY MASS INDEX: 23.27 KG/M2 | OXYGEN SATURATION: 99 % | HEIGHT: 67.5 IN | RESPIRATION RATE: 16 BRPM | HEART RATE: 84 BPM | WEIGHT: 150 LBS

## 2024-04-19 DIAGNOSIS — K60.2 ANAL FISSURE, UNSPECIFIED: ICD-10-CM

## 2024-04-19 DIAGNOSIS — M17.12 UNILATERAL PRIMARY OSTEOARTHRITIS, LEFT KNEE: ICD-10-CM

## 2024-04-19 PROCEDURE — 46505 CHEMODENERVATION ANAL MUSC: CPT

## 2024-04-22 ENCOUNTER — TRANSCRIPTION ENCOUNTER (OUTPATIENT)
Age: 64
End: 2024-04-22

## 2024-04-22 DIAGNOSIS — E04.1 NONTOXIC SINGLE THYROID NODULE: ICD-10-CM

## 2024-05-01 ENCOUNTER — TRANSCRIPTION ENCOUNTER (OUTPATIENT)
Age: 64
End: 2024-05-01

## 2024-05-03 ENCOUNTER — APPOINTMENT (OUTPATIENT)
Dept: INTERNAL MEDICINE | Facility: CLINIC | Age: 64
End: 2024-05-03
Payer: COMMERCIAL

## 2024-05-03 VITALS — SYSTOLIC BLOOD PRESSURE: 120 MMHG | DIASTOLIC BLOOD PRESSURE: 70 MMHG | TEMPERATURE: 97.5 F | OXYGEN SATURATION: 99 %

## 2024-05-03 DIAGNOSIS — M79.10 MYALGIA, UNSPECIFIED SITE: ICD-10-CM

## 2024-05-03 DIAGNOSIS — A69.23 ARTHRITIS DUE TO LYME DISEASE: ICD-10-CM

## 2024-05-03 PROCEDURE — 36415 COLL VENOUS BLD VENIPUNCTURE: CPT

## 2024-05-03 PROCEDURE — 99214 OFFICE O/P EST MOD 30 MIN: CPT

## 2024-05-03 PROCEDURE — G2211 COMPLEX E/M VISIT ADD ON: CPT

## 2024-05-03 RX ORDER — PREDNISONE 10 MG/1
10 TABLET ORAL DAILY
Qty: 15 | Refills: 0 | Status: ACTIVE | COMMUNITY
Start: 2024-05-03 | End: 1900-01-01

## 2024-05-04 LAB
BASOPHILS # BLD AUTO: 0.03 K/UL
BASOPHILS NFR BLD AUTO: 0.3 %
EOSINOPHIL # BLD AUTO: 0.1 K/UL
EOSINOPHIL NFR BLD AUTO: 1 %
ERYTHROCYTE [SEDIMENTATION RATE] IN BLOOD BY WESTERGREN METHOD: 34 MM/HR
HCT VFR BLD CALC: 41.7 %
HGB BLD-MCNC: 13.5 G/DL
IMM GRANULOCYTES NFR BLD AUTO: 0.2 %
LYMPHOCYTES # BLD AUTO: 2.02 K/UL
LYMPHOCYTES NFR BLD AUTO: 20.9 %
MAN DIFF?: NORMAL
MCHC RBC-ENTMCNC: 26.4 PG
MCHC RBC-ENTMCNC: 32.4 GM/DL
MCV RBC AUTO: 81.4 FL
MONOCYTES # BLD AUTO: 0.7 K/UL
MONOCYTES NFR BLD AUTO: 7.2 %
NEUTROPHILS # BLD AUTO: 6.81 K/UL
NEUTROPHILS NFR BLD AUTO: 70.4 %
PLATELET # BLD AUTO: 333 K/UL
RBC # BLD: 5.12 M/UL
RBC # FLD: 14.6 %
WBC # FLD AUTO: 9.68 K/UL

## 2024-05-06 PROBLEM — A69.23 LYME ARTHRITIS: Status: ACTIVE | Noted: 2024-04-12

## 2024-05-06 PROBLEM — M79.10 MYALGIA: Status: ACTIVE | Noted: 2024-05-03

## 2024-05-06 NOTE — PHYSICAL EXAM
[No Acute Distress] : no acute distress [Well Nourished] : well nourished [No Accessory Muscle Use] : no accessory muscle use [Clear to Auscultation] : lungs were clear to auscultation bilaterally [Regular Rhythm] : with a regular rhythm [Normal S1, S2] : normal S1 and S2 [No Murmur] : no murmur heard [Normal] : the cranial nerves were intact [Sensation Tactile Decrease] : light touch was intact [2+] : left 2+ [Normal Affect] : the affect was normal [Normal Insight/Judgement] : insight and judgment were intact [de-identified] : TTP of b/l DIP , PIP, shoulders, knees

## 2024-05-06 NOTE — HISTORY OF PRESENT ILLNESS
[FreeTextEntry1] :  arthritis  [de-identified] : f/u for possible Lyme's arthritis - progresive myalgia x 7 weeks despite treatment doxyxycline (2/3 weeks) for possible Lyme arthritis yesterday minimal chills - no fever no ha   severe joint pain  (hands, hips, elbows, shoulders) but not swollen or erythematous - worse in am - also muscle ache in arm as per pt "she has a high pain tolerance"

## 2024-05-07 ENCOUNTER — TRANSCRIPTION ENCOUNTER (OUTPATIENT)
Age: 64
End: 2024-05-07

## 2024-05-07 LAB
ALBUMIN SERPL ELPH-MCNC: 4 G/DL
ALP BLD-CCNC: 82 U/L
ALT SERPL-CCNC: 11 U/L
ANION GAP SERPL CALC-SCNC: 14 MMOL/L
AST SERPL-CCNC: 13 U/L
BILIRUB SERPL-MCNC: 0.2 MG/DL
BUN SERPL-MCNC: 19 MG/DL
CALCIUM SERPL-MCNC: 9.5 MG/DL
CHLORIDE SERPL-SCNC: 100 MMOL/L
CO2 SERPL-SCNC: 24 MMOL/L
CREAT SERPL-MCNC: 0.68 MG/DL
CRP SERPL-MCNC: <3 MG/L
EGFR: 97 ML/MIN/1.73M2
GLUCOSE SERPL-MCNC: 84 MG/DL
POTASSIUM SERPL-SCNC: 4.9 MMOL/L
PROT SERPL-MCNC: 6.9 G/DL
SODIUM SERPL-SCNC: 138 MMOL/L

## 2024-05-16 ENCOUNTER — APPOINTMENT (OUTPATIENT)
Dept: RHEUMATOLOGY | Facility: CLINIC | Age: 64
End: 2024-05-16
Payer: COMMERCIAL

## 2024-05-16 VITALS
BODY MASS INDEX: 23.27 KG/M2 | SYSTOLIC BLOOD PRESSURE: 131 MMHG | RESPIRATION RATE: 16 BRPM | DIASTOLIC BLOOD PRESSURE: 81 MMHG | OXYGEN SATURATION: 98 % | HEART RATE: 73 BPM | WEIGHT: 150 LBS | HEIGHT: 67.5 IN

## 2024-05-16 DIAGNOSIS — M19.90 UNSPECIFIED OSTEOARTHRITIS, UNSPECIFIED SITE: ICD-10-CM

## 2024-05-16 PROCEDURE — 99204 OFFICE O/P NEW MOD 45 MIN: CPT

## 2024-05-16 PROCEDURE — G2211 COMPLEX E/M VISIT ADD ON: CPT

## 2024-05-16 RX ORDER — PREDNISONE 5 MG/1
5 TABLET ORAL
Qty: 120 | Refills: 0 | Status: ACTIVE | COMMUNITY
Start: 2024-05-16 | End: 1900-01-01

## 2024-05-16 NOTE — HISTORY OF PRESENT ILLNESS
[FreeTextEntry1] :  -Onset around April, initially with knee pain (without swelling) then started having generalized pain in the hips, knees, hands, elbows no swelling worse in the morning, morning stiffness  takes Naproxen in the morning, for the past 2 weeks, unclear if it helps Preceding to the onset of the symptoms, no prior URI or GI/  infection no sick exposures, no exposures to ticks Was prescribed doxycycline per PMD for possible Lyme, no change in symptoms Prescribed prednisone 15 mg daily, felt improvement at the beginning then lost efficacy at the end of the month No fevers or chills No rashes No oral or nasal ulcers No respiratory, GI or  symptoms

## 2024-05-16 NOTE — ASSESSMENT
[FreeTextEntry1] : # New onset of pain involving several joints including knees, hips, shoulders, elbows and hands No reported swelling, but endorses worsening symptoms in the morning, unclear if associated with morning stiffness as she has been taking NSAIDs in the am  Exam with possible trace synovitis at the right wrist, tenderness to bilateral shoulders/ arms and hips with preserved ROM Labs notable for mild elevation of ESR, normal CRP. RF borderline, negative CCP and MEGAN/Sjogren's  No preceding URI/ GI or  symptoms- not consistent with reactive arthritis No exposure to ticks, no outdoor activities- doubt Lyme, already treated  PMR is a consideration, though would not expect generalized arthralgias  Seronegative inflammatory arthritis (given negative serology) is a possibility Discussed with patient above diagnoses, discussed obtaining an US to assess for synovitis but the patient would like to hold off for now (it has to be done before steroids) Will obtain blood work today She is in agreement with starting a trial of steroids for now. SEs reviewed, to stop NSAIDs  RTO in 2 weeks to assess response and discuss next steps in therapy

## 2024-05-16 NOTE — DATA REVIEWED
[FreeTextEntry1] : Labs, imaging and chart notes reviewed today with patient  mild elevation of inflammatory markers RF borderline rest of serology negative X-rays of knee mild OA

## 2024-05-16 NOTE — PHYSICAL EXAM
[General Appearance - Alert] : alert [General Appearance - In No Acute Distress] : in no acute distress [Respiration, Rhythm And Depth] : normal respiratory rhythm and effort [FreeTextEntry1] : tenderness to bilateral shoulders, arms, knees and hips with preserved ROM. possible trace synovitis at right wrist [] : no rash [Impaired Insight] : insight and judgment were intact

## 2024-05-16 NOTE — CONSULT LETTER
[Dear  ___] : Dear  [unfilled], [Please see my note below.] : Please see my note below. [Sincerely,] : Sincerely, [FreeTextEntry3] : Cheyanne Hodge MD , [facility] at [facility] Division of Rheumatology

## 2024-05-17 LAB
CRP SERPL-MCNC: 6 MG/L
ERYTHROCYTE [SEDIMENTATION RATE] IN BLOOD BY WESTERGREN METHOD: 20 MM/HR
HBV CORE IGG+IGM SER QL: NONREACTIVE
HBV SURFACE AG SER QL: NONREACTIVE
HCV AB SER QL: NONREACTIVE
HCV S/CO RATIO: 0.11 S/CO

## 2024-05-21 ENCOUNTER — TRANSCRIPTION ENCOUNTER (OUTPATIENT)
Age: 64
End: 2024-05-21

## 2024-05-21 LAB
M TB IFN-G BLD-IMP: NEGATIVE
QUANTIFERON TB PLUS MITOGEN MINUS NIL: >10 IU/ML
QUANTIFERON TB PLUS NIL: 0.05 IU/ML
QUANTIFERON TB PLUS TB1 MINUS NIL: 0.01 IU/ML
QUANTIFERON TB PLUS TB2 MINUS NIL: 0.03 IU/ML

## 2024-05-21 RX ORDER — GABAPENTIN 100 MG/1
100 CAPSULE ORAL
Qty: 180 | Refills: 2 | Status: ACTIVE | COMMUNITY
Start: 2023-07-05 | End: 1900-01-01

## 2024-05-22 ENCOUNTER — TRANSCRIPTION ENCOUNTER (OUTPATIENT)
Age: 64
End: 2024-05-22

## 2024-05-22 LAB — G6PD SER-CCNC: 13.8 U/G HGB

## 2024-05-29 ENCOUNTER — APPOINTMENT (OUTPATIENT)
Dept: INTERNAL MEDICINE | Facility: CLINIC | Age: 64
End: 2024-05-29
Payer: COMMERCIAL

## 2024-05-29 VITALS — SYSTOLIC BLOOD PRESSURE: 120 MMHG | TEMPERATURE: 98.5 F | DIASTOLIC BLOOD PRESSURE: 80 MMHG

## 2024-05-29 DIAGNOSIS — J02.9 ACUTE PHARYNGITIS, UNSPECIFIED: ICD-10-CM

## 2024-05-29 DIAGNOSIS — R31.0 GROSS HEMATURIA: ICD-10-CM

## 2024-05-29 LAB
BILIRUB UR QL STRIP: NEGATIVE
CLARITY UR: CLEAR
COLLECTION METHOD: NORMAL
GLUCOSE UR-MCNC: NEGATIVE
HCG UR QL: 0.2 EU/DL
HGB UR QL STRIP.AUTO: ABNORMAL
KETONES UR-MCNC: NEGATIVE
LEUKOCYTE ESTERASE UR QL STRIP: ABNORMAL
NITRITE UR QL STRIP: POSITIVE
PH UR STRIP: 6
PROT UR STRIP-MCNC: NEGATIVE
SP GR UR STRIP: 1

## 2024-05-29 PROCEDURE — 99214 OFFICE O/P EST MOD 30 MIN: CPT

## 2024-05-29 PROCEDURE — 81003 URINALYSIS AUTO W/O SCOPE: CPT | Mod: QW

## 2024-05-30 ENCOUNTER — TRANSCRIPTION ENCOUNTER (OUTPATIENT)
Age: 64
End: 2024-05-30

## 2024-05-30 LAB
APPEARANCE: CLEAR
BACTERIA: ABNORMAL /HPF
BILIRUBIN URINE: NEGATIVE
BLOOD URINE: ABNORMAL
CAST: 2 /LPF
COARSE GRANULAR CASTS: PRESENT
COLOR: YELLOW
EPITHELIAL CELLS: 2 /HPF
GLUCOSE QUALITATIVE U: NEGATIVE MG/DL
INFLUENZA A RESULT: NOT DETECTED
INFLUENZA B RESULT: NOT DETECTED
KETONES URINE: NEGATIVE MG/DL
LEUKOCYTE ESTERASE URINE: ABNORMAL
MICROSCOPIC-UA: NORMAL
NITRITE URINE: POSITIVE
PH URINE: 5.5
PROTEIN URINE: NEGATIVE MG/DL
RED BLOOD CELLS URINE: 0 /HPF
RESP SYN VIRUS RESULT: NOT DETECTED
REVIEW: NORMAL
SARS-COV-2 RESULT: NOT DETECTED
SPECIFIC GRAVITY URINE: 1.01
UROBILINOGEN URINE: 0.2 MG/DL
WHITE BLOOD CELLS URINE: 23 /HPF

## 2024-05-31 ENCOUNTER — TRANSCRIPTION ENCOUNTER (OUTPATIENT)
Age: 64
End: 2024-05-31

## 2024-05-31 ENCOUNTER — APPOINTMENT (OUTPATIENT)
Dept: RHEUMATOLOGY | Facility: CLINIC | Age: 64
End: 2024-05-31
Payer: COMMERCIAL

## 2024-05-31 VITALS
DIASTOLIC BLOOD PRESSURE: 88 MMHG | BODY MASS INDEX: 23.27 KG/M2 | WEIGHT: 150 LBS | SYSTOLIC BLOOD PRESSURE: 145 MMHG | HEART RATE: 80 BPM | HEIGHT: 67.5 IN | RESPIRATION RATE: 16 BRPM | TEMPERATURE: 98.1 F | OXYGEN SATURATION: 98 %

## 2024-05-31 DIAGNOSIS — M25.512 PAIN IN RIGHT SHOULDER: ICD-10-CM

## 2024-05-31 DIAGNOSIS — M25.511 PAIN IN RIGHT SHOULDER: ICD-10-CM

## 2024-05-31 PROCEDURE — 99214 OFFICE O/P EST MOD 30 MIN: CPT

## 2024-05-31 PROCEDURE — G2211 COMPLEX E/M VISIT ADD ON: CPT

## 2024-05-31 NOTE — HISTORY OF PRESENT ILLNESS
[de-identified] : after last visit [FreeTextEntry1] : took prednisone 20 mg daily for a week then 15 mg daily reports no response and continues to have the same joint pain no new symptoms except that she noted blood in urine and found to have a UTI, symptoms resolved after taking ABx she is also endorsing throat pain, culture negative

## 2024-05-31 NOTE — ASSESSMENT
[FreeTextEntry1] : # New onset of pain involving several joints including knees, hips, shoulders, elbows and hands No reported swelling, but endorses worsening symptoms in the morning, unclear if associated with morning stiffness as she has been taking NSAIDs in the am Exam with possible trace synovitis at the right wrist, tenderness to bilateral shoulders/ arms and hips with preserved ROM Labs notable for mild elevation of ESR/ CRP. RF borderline, negative CCP and MEGAN/Sjogren's No preceding URI/ GI or  symptoms No exposure to ticks, no outdoor activities- doubt Lyme, already treated PMR is a consideration, though would not expect generalized arthralgias Seronegative inflammatory arthritis (given negative serology) is a possibility [] since last visit, reports no response to steroids at all not even partial  would make inflammatory arthritis or PMR unusual as it would have responded, at least partially Given ongoing UTI and lack of response, advised her to taper and stop prednisone Obtain US of bilateral hands/wrists and shoulders to assess for objective evidence of synovitis She will complete treatment for UTI  She is endorsing sore throat but no fevers/chills or rashes, her culture/swab negative  follow up with me after US done  or earlier if worsening symptoms

## 2024-05-31 NOTE — DATA REVIEWED
[FreeTextEntry1] : Labs, imaging and chart notes reviewed today with patient negative serology CRP mildly elevated ESR normal

## 2024-05-31 NOTE — PHYSICAL EXAM
[General Appearance - Alert] : alert [General Appearance - In No Acute Distress] : in no acute distress [Respiration, Rhythm And Depth] : normal respiratory rhythm and effort [FreeTextEntry1] : tenderness at bilateral wrists and arms  [] : no rash [Impaired Insight] : insight and judgment were intact

## 2024-06-02 PROBLEM — J02.9 ACUTE PHARYNGITIS: Status: ACTIVE | Noted: 2024-05-29 | Resolved: 2024-06-28

## 2024-06-02 PROBLEM — R31.0 GROSS HEMATURIA: Status: ACTIVE | Noted: 2024-05-29 | Resolved: 2024-06-28

## 2024-06-02 NOTE — REVIEW OF SYSTEMS
[Negative] : Constitutional [Abdominal Pain] : no abdominal pain [Nausea] : no nausea [Vomiting] : no vomiting [FreeTextEntry8] : see HPI

## 2024-06-02 NOTE — HISTORY OF PRESENT ILLNESS
[FreeTextEntry8] : cc: r/o UTI   +gross hematuria  has not had period for 20 years   +urinary frequency no dysuria  no urgency     ENT - 1 day of sore throat, no fevers, no chills

## 2024-06-02 NOTE — PHYSICAL EXAM
[No Acute Distress] : no acute distress [Well Nourished] : well nourished [No Accessory Muscle Use] : no accessory muscle use [Clear to Auscultation] : lungs were clear to auscultation bilaterally [Regular Rhythm] : with a regular rhythm [Normal S1, S2] : normal S1 and S2 [No Murmur] : no murmur heard [No Edema] : there was no peripheral edema [Soft] : abdomen soft [Non Tender] : non-tender [Non-distended] : non-distended [No Masses] : no abdominal mass palpated [No HSM] : no HSM [Normal Bowel Sounds] : normal bowel sounds [No CVA Tenderness] : no CVA  tenderness [Normal Affect] : the affect was normal [Normal Insight/Judgement] : insight and judgment were intact [de-identified] : +erythema of posterior oropharynx

## 2024-06-03 ENCOUNTER — TRANSCRIPTION ENCOUNTER (OUTPATIENT)
Age: 64
End: 2024-06-03

## 2024-06-05 ENCOUNTER — TRANSCRIPTION ENCOUNTER (OUTPATIENT)
Age: 64
End: 2024-06-05

## 2024-06-05 LAB
BACTERIA THROAT CULT: NORMAL
BACTERIA UR CULT: ABNORMAL

## 2024-06-07 DIAGNOSIS — N39.0 URINARY TRACT INFECTION, SITE NOT SPECIFIED: ICD-10-CM

## 2024-06-07 RX ORDER — NITROFURANTOIN (MONOHYDRATE/MACROCRYSTALS) 25; 75 MG/1; MG/1
100 CAPSULE ORAL
Qty: 10 | Refills: 0 | Status: ACTIVE | COMMUNITY
Start: 2024-05-29 | End: 1900-01-01

## 2024-06-28 ENCOUNTER — TRANSCRIPTION ENCOUNTER (OUTPATIENT)
Age: 64
End: 2024-06-28

## 2024-06-28 DIAGNOSIS — M79.642 PAIN IN RIGHT HAND: ICD-10-CM

## 2024-06-28 DIAGNOSIS — M79.641 PAIN IN RIGHT HAND: ICD-10-CM

## 2024-07-01 ENCOUNTER — TRANSCRIPTION ENCOUNTER (OUTPATIENT)
Age: 64
End: 2024-07-01

## 2024-07-01 ENCOUNTER — LABORATORY RESULT (OUTPATIENT)
Age: 64
End: 2024-07-01

## 2024-07-01 LAB
ALBUMIN SERPL ELPH-MCNC: 4.4 G/DL
ALP BLD-CCNC: 73 U/L
ALT SERPL-CCNC: 11 U/L
ANION GAP SERPL CALC-SCNC: 11 MMOL/L
APPEARANCE: CLEAR
AST SERPL-CCNC: 11 U/L
BACTERIA: NEGATIVE /HPF
BASOPHILS # BLD AUTO: 0.02 K/UL
BASOPHILS NFR BLD AUTO: 0.3 %
BILIRUB SERPL-MCNC: 0.3 MG/DL
BILIRUBIN URINE: NEGATIVE
BLOOD URINE: NEGATIVE
BUN SERPL-MCNC: 15 MG/DL
CALCIUM SERPL-MCNC: 9.6 MG/DL
CAST: 0 /LPF
CHLORIDE SERPL-SCNC: 106 MMOL/L
CO2 SERPL-SCNC: 24 MMOL/L
COLOR: YELLOW
CREAT SERPL-MCNC: 0.69 MG/DL
CREAT SPEC-SCNC: 31 MG/DL
CRP SERPL-MCNC: <3 MG/L
EGFR: 97 ML/MIN/1.73M2
EOSINOPHIL # BLD AUTO: 0.08 K/UL
EOSINOPHIL NFR BLD AUTO: 1.2 %
EPITHELIAL CELLS: 0 /HPF
ERYTHROCYTE [SEDIMENTATION RATE] IN BLOOD BY WESTERGREN METHOD: 19 MM/HR
ESTIMATED AVERAGE GLUCOSE: 117 MG/DL
FERRITIN SERPL-MCNC: 47 NG/ML
GLUCOSE QUALITATIVE U: NEGATIVE MG/DL
GLUCOSE SERPL-MCNC: 103 MG/DL
HBA1C MFR BLD HPLC: 5.7 %
HCT VFR BLD CALC: 43.6 %
HGB BLD-MCNC: 13.1 G/DL
IMM GRANULOCYTES NFR BLD AUTO: 0.3 %
KETONES URINE: NEGATIVE MG/DL
LEUKOCYTE ESTERASE URINE: ABNORMAL
LYMPHOCYTES # BLD AUTO: 1.52 K/UL
LYMPHOCYTES NFR BLD AUTO: 22.7 %
MAN DIFF?: NORMAL
MCHC RBC-ENTMCNC: 25.4 PG
MCHC RBC-ENTMCNC: 30 GM/DL
MCV RBC AUTO: 84.5 FL
MICROALBUMIN 24H UR DL<=1MG/L-MCNC: <1.2 MG/DL
MICROALBUMIN/CREAT 24H UR-RTO: NORMAL MG/G
MICROSCOPIC-UA: NORMAL
MONOCYTES # BLD AUTO: 0.39 K/UL
MONOCYTES NFR BLD AUTO: 5.8 %
NEUTROPHILS # BLD AUTO: 4.66 K/UL
NEUTROPHILS NFR BLD AUTO: 69.7 %
NITRITE URINE: NEGATIVE
PH URINE: 7
PLATELET # BLD AUTO: 283 K/UL
POTASSIUM SERPL-SCNC: 4.4 MMOL/L
PROT SERPL-MCNC: 6.8 G/DL
PROT SERPL-MCNC: 6.8 G/DL
PROTEIN URINE: NEGATIVE MG/DL
RBC # BLD: 5.16 M/UL
RBC # FLD: 14.7 %
RED BLOOD CELLS URINE: 0 /HPF
SODIUM SERPL-SCNC: 142 MMOL/L
SPECIFIC GRAVITY URINE: 1.01
T4 FREE SERPL-MCNC: 1.2 NG/DL
TSH SERPL-ACNC: 2.77 UIU/ML
UROBILINOGEN URINE: 0.2 MG/DL
WBC # FLD AUTO: 6.69 K/UL
WHITE BLOOD CELLS URINE: 0 /HPF

## 2024-07-02 LAB
ALBUPE: 13.1 %
ALPHA1UPE: 32.9 %
ALPHA2UPE: 15.5 %
BACTERIA UR CULT: NORMAL
BETAUPE: 8.5 %
DSDNA AB SER-ACNC: 3 IU/ML
GAMMAUPE: 30 %
IGA 24H UR QL IFE: NORMAL
KAPPA LC 24H UR QL: NORMAL
PROT PATTERN 24H UR ELPH-IMP: NORMAL
PROT UR-MCNC: 4 MG/DL
PROT UR-MCNC: 4 MG/DL

## 2024-07-03 DIAGNOSIS — R39.15 URGENCY OF URINATION: ICD-10-CM

## 2024-07-03 RX ORDER — NITROFURANTOIN (MONOHYDRATE/MACROCRYSTALS) 25; 75 MG/1; MG/1
100 CAPSULE ORAL
Qty: 10 | Refills: 0 | Status: ACTIVE | COMMUNITY
Start: 2024-07-03 | End: 1900-01-01

## 2024-07-09 ENCOUNTER — NON-APPOINTMENT (OUTPATIENT)
Age: 64
End: 2024-07-09

## 2024-07-09 ENCOUNTER — TRANSCRIPTION ENCOUNTER (OUTPATIENT)
Age: 64
End: 2024-07-09

## 2024-07-22 NOTE — PRE-ANESTHESIA EVALUATION ADULT - NSANTHALCOHOLSD_GEN_ALL_CORE
Consent: The patient's consent was obtained including but not limited to risks of crusting, scabbing, blistering, scarring, darker or lighter pigmentary change, recurrence, incomplete removal and infection. Show Topical Anesthesia Variable?: Yes Detail Level: Detailed Medical Necessity Information: It is in your best interest to select a reason for this procedure from the list below. All of these items fulfill various CMS LCD requirements except the new and changing color options. Post-Care Instructions: I reviewed with the patient in detail post-care instructions. Patient is to wear sunprotection, and avoid picking at any of the treated lesions. Pt may apply Vaseline to crusted or scabbing areas. Render Note In Bullet Format When Appropriate: No Spray Paint Text: The liquid nitrogen was applied to the skin utilizing a spray paint frosting technique. Medical Necessity Clause: This procedure was medically necessary because the lesions that were treated were: No

## 2024-11-20 ENCOUNTER — RX RENEWAL (OUTPATIENT)
Age: 64
End: 2024-11-20

## 2024-11-29 ENCOUNTER — TRANSCRIPTION ENCOUNTER (OUTPATIENT)
Age: 64
End: 2024-11-29

## 2024-12-03 ENCOUNTER — APPOINTMENT (OUTPATIENT)
Dept: UROGYNECOLOGY | Facility: CLINIC | Age: 64
End: 2024-12-03
Payer: MEDICARE

## 2024-12-03 VITALS
BODY MASS INDEX: 23.89 KG/M2 | HEART RATE: 71 BPM | DIASTOLIC BLOOD PRESSURE: 81 MMHG | HEIGHT: 67.5 IN | SYSTOLIC BLOOD PRESSURE: 137 MMHG | WEIGHT: 154 LBS | TEMPERATURE: 98 F | OXYGEN SATURATION: 97 %

## 2024-12-03 DIAGNOSIS — R39.15 URGENCY OF URINATION: ICD-10-CM

## 2024-12-03 DIAGNOSIS — N81.2 INCOMPLETE UTEROVAGINAL PROLAPSE: ICD-10-CM

## 2024-12-03 DIAGNOSIS — N39.0 URINARY TRACT INFECTION, SITE NOT SPECIFIED: ICD-10-CM

## 2024-12-03 DIAGNOSIS — R33.9 RETENTION OF URINE, UNSPECIFIED: ICD-10-CM

## 2024-12-03 PROCEDURE — 99204 OFFICE O/P NEW MOD 45 MIN: CPT | Mod: 25

## 2024-12-03 PROCEDURE — 51701 INSERT BLADDER CATHETER: CPT

## 2024-12-03 PROCEDURE — 99459 PELVIC EXAMINATION: CPT

## 2024-12-03 RX ORDER — ESTRADIOL 0.1 MG/G
0.1 CREAM VAGINAL
Qty: 1 | Refills: 3 | Status: ACTIVE | COMMUNITY
Start: 2024-12-03 | End: 1900-01-01

## 2024-12-05 LAB — BACTERIA UR CULT: NORMAL

## 2024-12-13 ENCOUNTER — NON-APPOINTMENT (OUTPATIENT)
Age: 64
End: 2024-12-13

## 2024-12-13 ENCOUNTER — TRANSCRIPTION ENCOUNTER (OUTPATIENT)
Age: 64
End: 2024-12-13

## 2025-02-12 ENCOUNTER — APPOINTMENT (OUTPATIENT)
Dept: UROGYNECOLOGY | Facility: CLINIC | Age: 65
End: 2025-02-12

## 2025-02-19 ENCOUNTER — NON-APPOINTMENT (OUTPATIENT)
Age: 65
End: 2025-02-19

## 2025-02-19 ENCOUNTER — APPOINTMENT (OUTPATIENT)
Dept: INTERNAL MEDICINE | Facility: CLINIC | Age: 65
End: 2025-02-19
Payer: MEDICARE

## 2025-02-19 VITALS
DIASTOLIC BLOOD PRESSURE: 80 MMHG | SYSTOLIC BLOOD PRESSURE: 120 MMHG | BODY MASS INDEX: 23.98 KG/M2 | HEIGHT: 66.5 IN | WEIGHT: 151 LBS

## 2025-02-19 DIAGNOSIS — F32.A DEPRESSION, UNSPECIFIED: ICD-10-CM

## 2025-02-19 DIAGNOSIS — Z00.00 ENCOUNTER FOR GENERAL ADULT MEDICAL EXAMINATION W/OUT ABNORMAL FINDINGS: ICD-10-CM

## 2025-02-19 DIAGNOSIS — G47.00 INSOMNIA, UNSPECIFIED: ICD-10-CM

## 2025-02-19 DIAGNOSIS — K82.4 CHOLESTEROLOSIS OF GALLBLADDER: ICD-10-CM

## 2025-02-19 DIAGNOSIS — E04.1 NONTOXIC SINGLE THYROID NODULE: ICD-10-CM

## 2025-02-19 PROCEDURE — 99204 OFFICE O/P NEW MOD 45 MIN: CPT | Mod: 25

## 2025-02-19 PROCEDURE — G0402 INITIAL PREVENTIVE EXAM: CPT

## 2025-02-19 PROCEDURE — 36415 COLL VENOUS BLD VENIPUNCTURE: CPT

## 2025-02-19 PROCEDURE — G0439: CPT

## 2025-02-19 PROCEDURE — 93000 ELECTROCARDIOGRAM COMPLETE: CPT

## 2025-02-19 RX ORDER — TRAZODONE HYDROCHLORIDE 50 MG/1
50 TABLET ORAL
Qty: 30 | Refills: 1 | Status: ACTIVE | COMMUNITY
Start: 2025-02-19 | End: 1900-01-01

## 2025-02-20 PROBLEM — F32.A DEPRESSION: Status: ACTIVE | Noted: 2025-02-19

## 2025-02-20 LAB
ALBUMIN SERPL ELPH-MCNC: 4.4 G/DL
ALP BLD-CCNC: 71 U/L
ALT SERPL-CCNC: 17 U/L
ANION GAP SERPL CALC-SCNC: 12 MMOL/L
APPEARANCE: CLEAR
AST SERPL-CCNC: 14 U/L
BACTERIA: NEGATIVE /HPF
BASOPHILS # BLD AUTO: 0.04 K/UL
BASOPHILS NFR BLD AUTO: 0.5 %
BILIRUB SERPL-MCNC: 0.2 MG/DL
BILIRUBIN URINE: NEGATIVE
BLOOD URINE: NEGATIVE
BUN SERPL-MCNC: 19 MG/DL
CALCIUM OXALATE CRYSTALS: PRESENT
CALCIUM SERPL-MCNC: 9.7 MG/DL
CAST: 0 /LPF
CHLORIDE SERPL-SCNC: 104 MMOL/L
CHOLEST SERPL-MCNC: 215 MG/DL
CO2 SERPL-SCNC: 26 MMOL/L
COLOR: YELLOW
CREAT SERPL-MCNC: 0.7 MG/DL
EGFR: 96 ML/MIN/1.73M2
EOSINOPHIL # BLD AUTO: 0.08 K/UL
EOSINOPHIL NFR BLD AUTO: 0.9 %
EPITHELIAL CELLS: 3 /HPF
ESTIMATED AVERAGE GLUCOSE: 117 MG/DL
GLUCOSE QUALITATIVE U: NEGATIVE MG/DL
GLUCOSE SERPL-MCNC: 96 MG/DL
HBA1C MFR BLD HPLC: 5.7 %
HCT VFR BLD CALC: 42.4 %
HDLC SERPL-MCNC: 67 MG/DL
HGB BLD-MCNC: 13.5 G/DL
IMM GRANULOCYTES NFR BLD AUTO: 0.2 %
KETONES URINE: NEGATIVE MG/DL
LDLC SERPL CALC-MCNC: 133 MG/DL
LEUKOCYTE ESTERASE URINE: NEGATIVE
LYMPHOCYTES # BLD AUTO: 1.99 K/UL
LYMPHOCYTES NFR BLD AUTO: 22.9 %
MAN DIFF?: NORMAL
MCHC RBC-ENTMCNC: 26.7 PG
MCHC RBC-ENTMCNC: 31.8 G/DL
MCV RBC AUTO: 84 FL
MICROSCOPIC-UA: NORMAL
MONOCYTES # BLD AUTO: 0.43 K/UL
MONOCYTES NFR BLD AUTO: 4.9 %
NEUTROPHILS # BLD AUTO: 6.14 K/UL
NEUTROPHILS NFR BLD AUTO: 70.6 %
NITRITE URINE: NEGATIVE
NONHDLC SERPL-MCNC: 148 MG/DL
PH URINE: 5.5
PLATELET # BLD AUTO: 313 K/UL
POTASSIUM SERPL-SCNC: 4.6 MMOL/L
PROT SERPL-MCNC: 6.7 G/DL
PROTEIN URINE: NEGATIVE MG/DL
RBC # BLD: 5.05 M/UL
RBC # FLD: 13.9 %
RED BLOOD CELLS URINE: 3 /HPF
REVIEW: NORMAL
SODIUM SERPL-SCNC: 142 MMOL/L
SPECIFIC GRAVITY URINE: 1.02
T4 FREE SERPL-MCNC: 1.2 NG/DL
TRIGL SERPL-MCNC: 85 MG/DL
TSH SERPL-ACNC: 2.69 UIU/ML
UROBILINOGEN URINE: 0.2 MG/DL
WBC # FLD AUTO: 8.7 K/UL
WHITE BLOOD CELLS URINE: 0 /HPF

## 2025-02-20 RX ORDER — ELECTROLYTES/DEXTROSE
10 SOLUTION, ORAL ORAL
Refills: 0 | Status: ACTIVE | COMMUNITY
Start: 2025-02-20

## 2025-02-22 LAB — BACTERIA UR CULT: ABNORMAL

## 2025-03-14 ENCOUNTER — RX RENEWAL (OUTPATIENT)
Age: 65
End: 2025-03-14

## 2025-03-20 ENCOUNTER — TRANSCRIPTION ENCOUNTER (OUTPATIENT)
Age: 65
End: 2025-03-20

## 2025-05-12 ENCOUNTER — TRANSCRIPTION ENCOUNTER (OUTPATIENT)
Age: 65
End: 2025-05-12

## 2025-05-13 ENCOUNTER — TRANSCRIPTION ENCOUNTER (OUTPATIENT)
Age: 65
End: 2025-05-13

## 2025-05-16 ENCOUNTER — RESULT REVIEW (OUTPATIENT)
Age: 65
End: 2025-05-16

## 2025-05-16 ENCOUNTER — APPOINTMENT (OUTPATIENT)
Dept: RHEUMATOLOGY | Facility: CLINIC | Age: 65
End: 2025-05-16
Payer: MEDICARE

## 2025-05-16 VITALS
DIASTOLIC BLOOD PRESSURE: 78 MMHG | WEIGHT: 151 LBS | SYSTOLIC BLOOD PRESSURE: 131 MMHG | HEART RATE: 73 BPM | BODY MASS INDEX: 25.16 KG/M2 | HEIGHT: 65 IN | OXYGEN SATURATION: 98 %

## 2025-05-16 DIAGNOSIS — M79.642 PAIN IN RIGHT HAND: ICD-10-CM

## 2025-05-16 DIAGNOSIS — M79.641 PAIN IN RIGHT HAND: ICD-10-CM

## 2025-05-16 DIAGNOSIS — M19.90 UNSPECIFIED OSTEOARTHRITIS, UNSPECIFIED SITE: ICD-10-CM

## 2025-05-16 LAB
ALBUMIN SERPL ELPH-MCNC: 4.1 G/DL
ALP BLD-CCNC: 81 U/L
ALT SERPL-CCNC: 17 U/L
ANION GAP SERPL CALC-SCNC: 13 MMOL/L
AST SERPL-CCNC: 13 U/L
BASOPHILS # BLD AUTO: 0.03 K/UL
BASOPHILS NFR BLD AUTO: 0.4 %
BILIRUB SERPL-MCNC: 0.2 MG/DL
BUN SERPL-MCNC: 17 MG/DL
CALCIUM SERPL-MCNC: 9.5 MG/DL
CCP AB SER IA-ACNC: <8 U/ML
CHLORIDE SERPL-SCNC: 103 MMOL/L
CO2 SERPL-SCNC: 23 MMOL/L
CREAT SERPL-MCNC: 0.71 MG/DL
CRP SERPL-MCNC: 11 MG/L
EGFRCR SERPLBLD CKD-EPI 2021: 94 ML/MIN/1.73M2
EOSINOPHIL # BLD AUTO: 0.06 K/UL
EOSINOPHIL NFR BLD AUTO: 0.7 %
ERYTHROCYTE [SEDIMENTATION RATE] IN BLOOD BY WESTERGREN METHOD: 23 MM/HR
HCT VFR BLD CALC: 42.1 %
HGB BLD-MCNC: 13 G/DL
IMM GRANULOCYTES NFR BLD AUTO: 0.2 %
LYMPHOCYTES # BLD AUTO: 1.65 K/UL
LYMPHOCYTES NFR BLD AUTO: 19.5 %
MAN DIFF?: NORMAL
MCHC RBC-ENTMCNC: 25.9 PG
MCHC RBC-ENTMCNC: 30.9 G/DL
MCV RBC AUTO: 83.9 FL
MONOCYTES # BLD AUTO: 0.66 K/UL
MONOCYTES NFR BLD AUTO: 7.8 %
NEUTROPHILS # BLD AUTO: 6.04 K/UL
NEUTROPHILS NFR BLD AUTO: 71.4 %
PLATELET # BLD AUTO: 321 K/UL
POTASSIUM SERPL-SCNC: 5.1 MMOL/L
PROT SERPL-MCNC: 6.7 G/DL
RBC # BLD: 5.02 M/UL
RBC # FLD: 14 %
RF+CCP IGG SER-IMP: NEGATIVE
RHEUMATOID FACT SER QL: <10 IU/ML
SODIUM SERPL-SCNC: 140 MMOL/L
WBC # FLD AUTO: 8.46 K/UL

## 2025-05-16 PROCEDURE — 99204 OFFICE O/P NEW MOD 45 MIN: CPT

## 2025-05-16 PROCEDURE — G2211 COMPLEX E/M VISIT ADD ON: CPT

## 2025-05-16 RX ORDER — PREDNISONE 20 MG/1
20 TABLET ORAL
Qty: 45 | Refills: 0 | Status: ACTIVE | COMMUNITY
Start: 2025-05-16 | End: 1900-01-01

## 2025-05-19 ENCOUNTER — TRANSCRIPTION ENCOUNTER (OUTPATIENT)
Age: 65
End: 2025-05-19

## 2025-05-20 ENCOUNTER — TRANSCRIPTION ENCOUNTER (OUTPATIENT)
Age: 65
End: 2025-05-20

## 2025-05-20 ENCOUNTER — NON-APPOINTMENT (OUTPATIENT)
Age: 65
End: 2025-05-20

## 2025-05-21 ENCOUNTER — TRANSCRIPTION ENCOUNTER (OUTPATIENT)
Age: 65
End: 2025-05-21

## 2025-05-21 ENCOUNTER — APPOINTMENT (OUTPATIENT)
Dept: RADIOLOGY | Facility: CLINIC | Age: 65
End: 2025-05-21

## 2025-05-21 DIAGNOSIS — Z12.39 ENCOUNTER FOR OTHER SCREENING FOR MALIGNANT NEOPLASM OF BREAST: ICD-10-CM

## 2025-05-21 LAB — G6PD SER-CCNC: 15.2 U/G HGB

## 2025-05-22 ENCOUNTER — TRANSCRIPTION ENCOUNTER (OUTPATIENT)
Age: 65
End: 2025-05-22

## 2025-05-23 ENCOUNTER — OUTPATIENT (OUTPATIENT)
Dept: OUTPATIENT SERVICES | Facility: HOSPITAL | Age: 65
LOS: 1 days | End: 2025-05-23
Payer: MEDICARE

## 2025-05-23 ENCOUNTER — APPOINTMENT (OUTPATIENT)
Dept: RADIOLOGY | Facility: CLINIC | Age: 65
End: 2025-05-23
Payer: MEDICARE

## 2025-05-23 ENCOUNTER — APPOINTMENT (OUTPATIENT)
Dept: ULTRASOUND IMAGING | Facility: CLINIC | Age: 65
End: 2025-05-23

## 2025-05-23 ENCOUNTER — RX RENEWAL (OUTPATIENT)
Age: 65
End: 2025-05-23

## 2025-05-23 ENCOUNTER — TRANSCRIPTION ENCOUNTER (OUTPATIENT)
Age: 65
End: 2025-05-23

## 2025-05-23 DIAGNOSIS — Z87.898 PERSONAL HISTORY OF OTHER SPECIFIED CONDITIONS: Chronic | ICD-10-CM

## 2025-05-23 DIAGNOSIS — M79.641 PAIN IN RIGHT HAND: ICD-10-CM

## 2025-05-23 DIAGNOSIS — K82.4 CHOLESTEROLOSIS OF GALLBLADDER: ICD-10-CM

## 2025-05-23 PROCEDURE — 76882 US LMTD JT/FCL EVL NVASC XTR: CPT | Mod: 26,LT

## 2025-05-23 PROCEDURE — 76882 US LMTD JT/FCL EVL NVASC XTR: CPT

## 2025-05-23 PROCEDURE — 73130 X-RAY EXAM OF HAND: CPT

## 2025-05-23 PROCEDURE — 73130 X-RAY EXAM OF HAND: CPT | Mod: 26,50

## 2025-06-16 NOTE — PRE-OP CHECKLIST - SURGICAL CONSENT
Patient plans for discharge home no needs once stable. Family to transport home.     [id number] Spoke with mother, follow up appointment made for Wed. At 10:30 with her PCP. No other needs.     For questions I can be reached at [phone]. ADRIANA Mendoza        done/1011

## 2025-06-24 ENCOUNTER — APPOINTMENT (OUTPATIENT)
Dept: RHEUMATOLOGY | Facility: CLINIC | Age: 65
End: 2025-06-24
Payer: MEDICARE

## 2025-06-24 VITALS
OXYGEN SATURATION: 97 % | HEART RATE: 85 BPM | HEIGHT: 65 IN | RESPIRATION RATE: 16 BRPM | DIASTOLIC BLOOD PRESSURE: 73 MMHG | BODY MASS INDEX: 24.83 KG/M2 | WEIGHT: 149 LBS | SYSTOLIC BLOOD PRESSURE: 124 MMHG

## 2025-06-24 PROBLEM — M35.3 POLYMYALGIA RHEUMATICA: Status: ACTIVE | Noted: 2025-06-24

## 2025-06-24 PROCEDURE — G2211 COMPLEX E/M VISIT ADD ON: CPT

## 2025-06-24 PROCEDURE — 99214 OFFICE O/P EST MOD 30 MIN: CPT

## 2025-06-24 RX ORDER — PREDNISONE 5 MG/1
5 TABLET ORAL
Qty: 120 | Refills: 0 | Status: ACTIVE | COMMUNITY
Start: 2025-06-24 | End: 1900-01-01

## 2025-06-25 ENCOUNTER — TRANSCRIPTION ENCOUNTER (OUTPATIENT)
Age: 65
End: 2025-06-25

## 2025-06-25 LAB
CRP SERPL-MCNC: <3 MG/L
ERYTHROCYTE [SEDIMENTATION RATE] IN BLOOD BY WESTERGREN METHOD: 14 MM/HR

## 2025-06-26 ENCOUNTER — TRANSCRIPTION ENCOUNTER (OUTPATIENT)
Age: 65
End: 2025-06-26

## 2025-08-01 ENCOUNTER — APPOINTMENT (OUTPATIENT)
Dept: RHEUMATOLOGY | Facility: CLINIC | Age: 65
End: 2025-08-01
Payer: MEDICARE

## 2025-08-01 VITALS
WEIGHT: 149 LBS | DIASTOLIC BLOOD PRESSURE: 84 MMHG | HEART RATE: 81 BPM | RESPIRATION RATE: 16 BRPM | OXYGEN SATURATION: 98 % | SYSTOLIC BLOOD PRESSURE: 134 MMHG | HEIGHT: 65 IN | BODY MASS INDEX: 24.83 KG/M2

## 2025-08-01 DIAGNOSIS — M19.90 UNSPECIFIED OSTEOARTHRITIS, UNSPECIFIED SITE: ICD-10-CM

## 2025-08-01 PROCEDURE — 99214 OFFICE O/P EST MOD 30 MIN: CPT

## 2025-08-01 PROCEDURE — G2211 COMPLEX E/M VISIT ADD ON: CPT

## 2025-08-01 RX ORDER — HYDROXYCHLOROQUINE SULFATE 200 MG/1
200 TABLET, FILM COATED ORAL
Qty: 60 | Refills: 0 | Status: ACTIVE | COMMUNITY
Start: 2025-08-01 | End: 1900-01-01

## 2025-08-04 ENCOUNTER — TRANSCRIPTION ENCOUNTER (OUTPATIENT)
Age: 65
End: 2025-08-04

## 2025-08-05 ENCOUNTER — TRANSCRIPTION ENCOUNTER (OUTPATIENT)
Age: 65
End: 2025-08-05

## 2025-08-28 ENCOUNTER — TRANSCRIPTION ENCOUNTER (OUTPATIENT)
Age: 65
End: 2025-08-28

## 2025-08-28 DIAGNOSIS — M19.90 UNSPECIFIED OSTEOARTHRITIS, UNSPECIFIED SITE: ICD-10-CM

## 2025-08-28 RX ORDER — METHOTREXATE 2.5 MG/1
2.5 TABLET ORAL
Qty: 48 | Refills: 0 | Status: ACTIVE | COMMUNITY
Start: 2025-08-28 | End: 1900-01-01

## 2025-09-02 ENCOUNTER — RX RENEWAL (OUTPATIENT)
Age: 65
End: 2025-09-02

## 2025-09-03 ENCOUNTER — TRANSCRIPTION ENCOUNTER (OUTPATIENT)
Age: 65
End: 2025-09-03

## 2025-09-04 ENCOUNTER — TRANSCRIPTION ENCOUNTER (OUTPATIENT)
Age: 65
End: 2025-09-04

## 2025-09-05 ENCOUNTER — TRANSCRIPTION ENCOUNTER (OUTPATIENT)
Age: 65
End: 2025-09-05

## 2025-09-09 ENCOUNTER — TRANSCRIPTION ENCOUNTER (OUTPATIENT)
Age: 65
End: 2025-09-09

## 2025-09-09 DIAGNOSIS — M35.3 POLYMYALGIA RHEUMATICA: ICD-10-CM

## 2025-09-10 ENCOUNTER — TRANSCRIPTION ENCOUNTER (OUTPATIENT)
Age: 65
End: 2025-09-10